# Patient Record
Sex: MALE | Race: WHITE | NOT HISPANIC OR LATINO | Employment: OTHER | URBAN - METROPOLITAN AREA
[De-identification: names, ages, dates, MRNs, and addresses within clinical notes are randomized per-mention and may not be internally consistent; named-entity substitution may affect disease eponyms.]

---

## 2017-08-31 ENCOUNTER — OFFICE VISIT (OUTPATIENT)
Dept: CARDIOLOGY | Facility: MEDICAL CENTER | Age: 77
End: 2017-08-31
Payer: MEDICARE

## 2017-08-31 ENCOUNTER — NON-PROVIDER VISIT (OUTPATIENT)
Dept: CARDIOLOGY | Facility: MEDICAL CENTER | Age: 77
End: 2017-08-31
Payer: MEDICARE

## 2017-08-31 VITALS
HEIGHT: 68 IN | SYSTOLIC BLOOD PRESSURE: 128 MMHG | DIASTOLIC BLOOD PRESSURE: 64 MMHG | HEART RATE: 78 BPM | WEIGHT: 238 LBS | OXYGEN SATURATION: 91 % | BODY MASS INDEX: 36.07 KG/M2

## 2017-08-31 DIAGNOSIS — I48.19 PERSISTENT ATRIAL FIBRILLATION (HCC): ICD-10-CM

## 2017-08-31 DIAGNOSIS — I10 ESSENTIAL HYPERTENSION: ICD-10-CM

## 2017-08-31 LAB — EKG IMPRESSION: NORMAL

## 2017-08-31 PROCEDURE — 93279 PRGRMG DEV EVAL PM/LDLS PM: CPT | Performed by: INTERNAL MEDICINE

## 2017-08-31 PROCEDURE — 93000 ELECTROCARDIOGRAM COMPLETE: CPT | Mod: 59 | Performed by: INTERNAL MEDICINE

## 2017-08-31 PROCEDURE — 99213 OFFICE O/P EST LOW 20 MIN: CPT | Performed by: INTERNAL MEDICINE

## 2017-08-31 RX ORDER — INSULIN DEGLUDEC 200 U/ML
8-10 INJECTION, SOLUTION SUBCUTANEOUS SEE ADMIN INSTRUCTIONS
COMMUNITY
Start: 2017-06-21

## 2017-08-31 ASSESSMENT — ENCOUNTER SYMPTOMS
DEPRESSION: 1
SHORTNESS OF BREATH: 1

## 2017-08-31 NOTE — PROGRESS NOTES
Subjective:   Kameron Elias is a 77 y.o. male who presents today for follow up of ppm and a fib.      His equilibrium is off.  Still plays golf 3 times a week.  Every time he moves he feels unsteady.    Some pleasures he cannot get back and attributes that to his age.    Past Medical History:   Diagnosis Date   • Neuropathic pain 12/31/2013   • Ataxia 12/31/2013   • Pain 6/29/12    b/l knees   • Benign hypertensive heart disease without heart failure 5/25/2012   • Fatigue 5/25/2012   • HTN (hypertension), malignant 5/25/2012   • Obesity 5/25/2012   • Acid reflux    • Arthritis    • Diabetes    • Hyperlipidemia    • Hypertension      Past Surgical History:   Procedure Laterality Date   • KNEE ARTHROPLASTY TOTAL  7/12/2012    Performed by ISABELLA MARK at [facility]   • CATARACT EXTRACTION WITH IOL       Family History   Problem Relation Age of Onset   • Hypertension Mother    • Lung Disease Father      COPD   • Diabetes     • Heart Disease       History   Smoking Status   • Former Smoker   • Packs/day: 1.00   • Years: 15.00   • Types: Cigarettes   • Quit date: 1/27/1971   Smokeless Tobacco   • Never Used     Comment: quit 40yrs ago     No Known Allergies  Outpatient Encounter Prescriptions as of 8/31/2017   Medication Sig Dispense Refill   • TRESIBA FLEXTOUCH 200 UNIT/ML Solution Pen-injector      • diltiazem CD (CARDIZEM CD) 360 MG CAPSULE SR 24 HR TAKE ONE CAPSULE BY MOUTH DAILY (GENERIC FOR CARDIZEM CD) 30 Cap 11   • insulin lispro (HUMALOG) 100 UNIT/ML SOLN Inject  as instructed 3 times a day before meals.     • aspirin EC (ECOTRIN) 325 MG TBEC Take 1 Tab by mouth every day. 90 Tab 6   • calcitRIOL (ROCALTROL) 0.25 MCG CAPS Take 0.25 mcg by mouth every day.     • sitagliptin (JANUVIA) 50 MG TABS Take 50 mg by mouth every day.     • ezetimibe (ZETIA) 10 MG TABS Take 10 mg by mouth every day.     • folic acid (FOLVITE) 1 MG TABS Take 1 mg by mouth every day.     • Cholecalciferol (VITAMIN D3) 1000 UNIT  "TABS Take 1,000 Units by mouth every day.     • atorvastatin (LIPITOR) 40 MG TABS Take 40 mg by mouth every day.     • furosemide (LASIX) 20 MG TABS Take 40 mg by mouth every day.     • omeprazole (PRILOSEC) 20 MG CPDR Take 20 mg by mouth every day.     • tramadol (ULTRAM) 50 MG TABS      • Insulin Glargine (LANTUS SC) Inject  as instructed.       No facility-administered encounter medications on file as of 8/31/2017.      Review of Systems   Respiratory: Positive for shortness of breath.    Neurological:        Unsteadiness   Psychiatric/Behavioral: Positive for depression.        Objective:   /64   Pulse 78   Ht 1.727 m (5' 7.99\")   Wt 108 kg (238 lb)   SpO2 91%   BMI 36.20 kg/m²     Physical Exam   Constitutional: He is oriented to person, place, and time. He appears well-developed and well-nourished. No distress.   HENT:   Head: Normocephalic and atraumatic.   Right Ear: External ear normal.   Left Ear: External ear normal.   Nose: Nose normal.   Mouth/Throat: Oropharynx is clear and moist.   Eyes: Conjunctivae and EOM are normal. Pupils are equal, round, and reactive to light. Right eye exhibits no discharge. Left eye exhibits no discharge. No scleral icterus.   Neck: Normal range of motion. Neck supple. No JVD present. No tracheal deviation present.   Cardiovascular: Normal rate, normal heart sounds and intact distal pulses.  An irregularly irregular rhythm present. Exam reveals no gallop and no friction rub.    No murmur heard.  Pulmonary/Chest: Effort normal and breath sounds normal. No stridor. No respiratory distress. He has no wheezes. He has no rales. He exhibits no tenderness.   Abdominal: Soft. He exhibits no distension. There is no tenderness.   Musculoskeletal: He exhibits no edema or tenderness.   Neurological: He is alert and oriented to person, place, and time. No cranial nerve deficit. Coordination normal.   Skin: Skin is warm and dry. No rash noted. He is not diaphoretic. No erythema. " No pallor.   Psychiatric: He has a normal mood and affect. His behavior is normal. Judgment and thought content normal.   Vitals reviewed.      Assessment:     1. Paroxysmal atrial fibrillation (CMS-HCC)  EKG   2. Essential hypertension  EKG       Medical Decision Making:  Today's Assessment / Status / Plan:   Persistent a fib - refuses oac.  Rate controlled.  No change in regimen.  Ppm functions well  htn well controlled

## 2018-01-24 ENCOUNTER — HOSPITAL ENCOUNTER (EMERGENCY)
Dept: HOSPITAL 8 - ED | Age: 78
Discharge: HOME | End: 2018-01-24
Payer: MEDICARE

## 2018-01-24 VITALS — SYSTOLIC BLOOD PRESSURE: 145 MMHG | DIASTOLIC BLOOD PRESSURE: 73 MMHG

## 2018-01-24 VITALS — HEIGHT: 68 IN | WEIGHT: 242.51 LBS | BODY MASS INDEX: 36.75 KG/M2

## 2018-01-24 DIAGNOSIS — Z95.0: ICD-10-CM

## 2018-01-24 DIAGNOSIS — J18.9: Primary | ICD-10-CM

## 2018-01-24 LAB
ALBUMIN SERPL-MCNC: 2.8 G/DL (ref 3.4–5)
ALP SERPL-CCNC: 92 U/L (ref 45–117)
ALT SERPL-CCNC: 20 U/L (ref 12–78)
ANION GAP SERPL CALC-SCNC: 8 MMOL/L (ref 5–15)
APTT BLD: 29 SECONDS (ref 25–31)
BASOPHILS # BLD AUTO: 0.02 X10^3/UL (ref 0–0.1)
BASOPHILS NFR BLD AUTO: 0 % (ref 0–1)
BILIRUB SERPL-MCNC: 0.6 MG/DL (ref 0.2–1)
CALCIUM SERPL-MCNC: 8.3 MG/DL (ref 8.5–10.1)
CHLORIDE SERPL-SCNC: 105 MMOL/L (ref 98–107)
CREAT SERPL-MCNC: 2.79 MG/DL (ref 0.7–1.3)
D DIMER PPP FEU-MCNC: 1.35 UG/MLFEU (ref 0–0.52)
EOSINOPHIL # BLD AUTO: 0.11 X10^3/UL (ref 0–0.4)
EOSINOPHIL NFR BLD AUTO: 1 % (ref 1–7)
ERYTHROCYTE [DISTWIDTH] IN BLOOD BY AUTOMATED COUNT: 15.8 % (ref 9.4–14.8)
INR PPP: 0.98 (ref 0.93–1.1)
LYMPHOCYTES # BLD AUTO: 1.87 X10^3/UL (ref 1–3.4)
LYMPHOCYTES NFR BLD AUTO: 16 % (ref 22–44)
MCH RBC QN AUTO: 28.8 PG (ref 27.5–34.5)
MCHC RBC AUTO-ENTMCNC: 33 G/DL (ref 33.2–36.2)
MCV RBC AUTO: 87.1 FL (ref 81–97)
MD: NO
MONOCYTES # BLD AUTO: 0.42 X10^3/UL (ref 0.2–0.8)
MONOCYTES NFR BLD AUTO: 4 % (ref 2–9)
NEUTROPHILS # BLD AUTO: 9.36 X10^3/UL (ref 1.8–6.8)
NEUTROPHILS NFR BLD AUTO: 80 % (ref 42–75)
PLATELET # BLD AUTO: 158 X10^3/UL (ref 130–400)
PMV BLD AUTO: 8.4 FL (ref 7.4–10.4)
PROT SERPL-MCNC: 6.6 G/DL (ref 6.4–8.2)
PROTHROMBIN TIME: 10.2 SECONDS (ref 9.6–11.5)
RBC # BLD AUTO: 3.55 X10^6/UL (ref 4.38–5.82)
TROPONIN I SERPL-MCNC: < 0.015 NG/ML (ref 0–0.04)

## 2018-01-24 PROCEDURE — A9540 TC99M MAA: HCPCS

## 2018-01-24 PROCEDURE — 78582 LUNG VENTILAT&PERFUS IMAGING: CPT

## 2018-01-24 PROCEDURE — 85025 COMPLETE CBC W/AUTO DIFF WBC: CPT

## 2018-01-24 PROCEDURE — 85379 FIBRIN DEGRADATION QUANT: CPT

## 2018-01-24 PROCEDURE — C9898 INPNT STAY RADIOLABELED ITEM: HCPCS

## 2018-01-24 PROCEDURE — 36415 COLL VENOUS BLD VENIPUNCTURE: CPT

## 2018-01-24 PROCEDURE — A9558 XE133 XENON 10MCI: HCPCS

## 2018-01-24 PROCEDURE — 93005 ELECTROCARDIOGRAM TRACING: CPT

## 2018-01-24 PROCEDURE — 71045 X-RAY EXAM CHEST 1 VIEW: CPT

## 2018-01-24 PROCEDURE — 84484 ASSAY OF TROPONIN QUANT: CPT

## 2018-01-24 PROCEDURE — 85610 PROTHROMBIN TIME: CPT

## 2018-01-24 PROCEDURE — 99285 EMERGENCY DEPT VISIT HI MDM: CPT

## 2018-01-24 PROCEDURE — 80053 COMPREHEN METABOLIC PANEL: CPT

## 2018-01-24 PROCEDURE — 85730 THROMBOPLASTIN TIME PARTIAL: CPT

## 2018-04-06 ENCOUNTER — NON-PROVIDER VISIT (OUTPATIENT)
Dept: CARDIOLOGY | Facility: MEDICAL CENTER | Age: 78
End: 2018-04-06
Payer: MEDICARE

## 2018-04-06 DIAGNOSIS — R00.1 BRADYCARDIA, SINUS: ICD-10-CM

## 2018-04-06 DIAGNOSIS — Z95.0 CARDIAC PACEMAKER IN SITU: ICD-10-CM

## 2018-04-06 PROCEDURE — 93279 PRGRMG DEV EVAL PM/LDLS PM: CPT | Performed by: INTERNAL MEDICINE

## 2019-01-11 ENCOUNTER — OFFICE VISIT (OUTPATIENT)
Dept: CARDIOLOGY | Facility: MEDICAL CENTER | Age: 79
End: 2019-01-11
Payer: MEDICARE

## 2019-01-11 ENCOUNTER — NON-PROVIDER VISIT (OUTPATIENT)
Dept: CARDIOLOGY | Facility: MEDICAL CENTER | Age: 79
End: 2019-01-11
Payer: MEDICARE

## 2019-01-11 VITALS
DIASTOLIC BLOOD PRESSURE: 62 MMHG | WEIGHT: 242.51 LBS | HEIGHT: 68 IN | SYSTOLIC BLOOD PRESSURE: 100 MMHG | OXYGEN SATURATION: 90 % | BODY MASS INDEX: 36.75 KG/M2 | HEART RATE: 62 BPM

## 2019-01-11 DIAGNOSIS — I50.32 CHRONIC DIASTOLIC CONGESTIVE HEART FAILURE (HCC): Chronic | ICD-10-CM

## 2019-01-11 DIAGNOSIS — I48.0 PAF (PAROXYSMAL ATRIAL FIBRILLATION) (HCC): Primary | ICD-10-CM

## 2019-01-11 DIAGNOSIS — N18.4 CKD (CHRONIC KIDNEY DISEASE), STAGE IV (HCC): ICD-10-CM

## 2019-01-11 DIAGNOSIS — I10 ESSENTIAL HYPERTENSION: ICD-10-CM

## 2019-01-11 DIAGNOSIS — R00.1 BRADYCARDIA, SINUS: ICD-10-CM

## 2019-01-11 DIAGNOSIS — I95.9 HYPOTENSION, UNSPECIFIED HYPOTENSION TYPE: ICD-10-CM

## 2019-01-11 DIAGNOSIS — Z95.0 CARDIAC PACEMAKER IN SITU: ICD-10-CM

## 2019-01-11 PROCEDURE — 93279 PRGRMG DEV EVAL PM/LDLS PM: CPT | Performed by: INTERNAL MEDICINE

## 2019-01-11 PROCEDURE — 99214 OFFICE O/P EST MOD 30 MIN: CPT | Performed by: INTERNAL MEDICINE

## 2019-01-11 RX ORDER — LISINOPRIL 10 MG/1
TABLET ORAL
COMMUNITY
Start: 2018-10-18 | End: 2019-01-11

## 2019-01-11 ASSESSMENT — ENCOUNTER SYMPTOMS: SHORTNESS OF BREATH: 1

## 2019-01-11 NOTE — LETTER
Name:          Kameron Elias   YOB: 1940  Date:     01/11/2019      Dorian De M.D.  [address] [facility] [place] [address]     Sonny Rosas MD  [address] [facility], [facility] [address]  [place] [address]  Phone: [phone]  Back Line: [phone]  Fax: [phone]  E-mail: [email or web address]   Dear Dr. De,    We had the pleasure of seeing your patient, Kameron Elias, in Cardiology Clinic at [facility] today.    As you know, he is a 78-year-old man with a history of sick sinus syndrome status post pacemaker implantation (implanted 9/2014), and stage IV chronic kidney disease. He has declined oral anticoagulants after serial recommendation.    He has no cardiovascular complaints today, but came in with a blood pressure of 84/60 mmHg.  We called his pharmacy as there is some question about his medications.  What we rectified from them was that he is on diltiazem 360 mg p.o. daily, atorvastatin, and aspirin.  There is previously some record of him being on lisinopril, and it appears that he has not been filling that medication in conjunction with his heart failure with preserved ejection fraction.  I certainly would not start him on that given his hypotension in our clinic today.  He did tell me that he would send in blood pressures in a month or so to confirm that he does not have hypotension at home otherwise.    I reviewed again with him his substantial risk for stroke risk factors including age of 78, hypertension, diabetes, and heart failure with preserved ejection fraction.  I again recommended an oral anticoagulant, and I would choose Eliquis at 2.5 mg p.o. twice daily given his age and renal function.  He is not comfortable at this point starting that though he does believe that he will started at the age of 81.  For now, he continues aspirin.    Return in about 6 months (around 7/11/2019).    Thank you for the referral and please do not hesitate to contact me at any time. My  contact information is listed above.    This note was dictated using Dragon speech recognition software.     A full note including my physical examination and a full list of rectified medications is available in our medical record, and can be faxed as well.    Sonny Rosas MD  Cardiologist  [facility]

## 2019-01-11 NOTE — PROGRESS NOTES
Chief Complaint   Patient presents with   • Atrial Fibrillation       Subjective:   Kameron Elias is a 78 -year-old man with a history of sick sinus syndrome status post pacemaker implantation (implanted 9/2014), and stage IV chronic kidney disease. He has declined oral anticoagulants after serial recommendation.    He is doing well today, and has no cardiovascular complaints no side effects on his blood pressure regimen.  He specifically denies orthostasis, and tells me that his home blood pressures are in the 120s over 80s mmHg reliably despite his blood pressure today in our clinic, which is much less than that.  He denies any orthopnea or significant lower extremity edema on Lasix.    He had quite a few questions about his original syncopal event when he had junctional rhythm and sepsis in 2013 when he was admitted at [facility].    Past Medical History:   Diagnosis Date   • Acid reflux    • Arthritis    • Ataxia 12/31/2013   • Benign hypertensive heart disease without heart failure 5/25/2012   • Diabetes    • Fatigue 5/25/2012   • HTN (hypertension), malignant 5/25/2012   • Hyperlipidemia    • Hypertension    • Neuropathic pain 12/31/2013   • Obesity 5/25/2012   • Pain 6/29/12    b/l knees     Past Surgical History:   Procedure Laterality Date   • KNEE ARTHROPLASTY TOTAL  7/12/2012    Performed by ISABELLA MARK at SURGERY [facility]   • CATARACT EXTRACTION WITH IOL       Family History   Problem Relation Age of Onset   • Hypertension Mother    • Lung Disease Father         COPD   • Diabetes Unknown    • Heart Disease Unknown      Social History     Social History   • Marital status:      Spouse name: N/A   • Number of children: N/A   • Years of education: N/A     Occupational History   • Not on file.     Social History Main Topics   • Smoking status: Former Smoker     Packs/day: 1.00     Years: 15.00     Types: Cigarettes     Quit date: 1/27/1971   • Smokeless tobacco: Never Used       Comment: quit 40yrs ago   • Alcohol use 10.5 oz/week     21 Standard drinks or equivalent per week   • Drug use: No   • Sexual activity: Not on file     Other Topics Concern   • Not on file     Social History Narrative   • No narrative on file     No Known Allergies  Outpatient Encounter Prescriptions as of 1/11/2019   Medication Sig Dispense Refill   • insulin aspart (NOVOLOG) 100 UNIT/ML Solution Inject  as instructed 3 times a day before meals.     • TRESIBA FLEXTOUCH 200 UNIT/ML Solution Pen-injector      • diltiazem CD (CARDIZEM CD) 360 MG CAPSULE SR 24 HR TAKE ONE CAPSULE BY MOUTH DAILY (GENERIC FOR CARDIZEM CD) 30 Cap 11   • aspirin EC (ECOTRIN) 325 MG TBEC Take 1 Tab by mouth every day. 90 Tab 6   • calcitRIOL (ROCALTROL) 0.25 MCG CAPS Take 0.25 mcg by mouth every day.     • sitagliptin (JANUVIA) 50 MG TABS Take 50 mg by mouth every day.     • ezetimibe (ZETIA) 10 MG TABS Take 10 mg by mouth every day.     • folic acid (FOLVITE) 1 MG TABS Take 1 mg by mouth every day.     • Cholecalciferol (VITAMIN D3) 1000 UNIT TABS Take 1,000 Units by mouth every day.     • atorvastatin (LIPITOR) 40 MG TABS Take 40 mg by mouth every day.     • furosemide (LASIX) 20 MG TABS Take 40 mg by mouth every day.     • omeprazole (PRILOSEC) 20 MG CPDR Take 20 mg by mouth every day.     • [DISCONTINUED] lisinopril (PRINIVIL) 10 MG Tab      • [DISCONTINUED] tramadol (ULTRAM) 50 MG TABS      • [DISCONTINUED] insulin lispro (HUMALOG) 100 UNIT/ML SOLN Inject  as instructed 3 times a day before meals.     • [DISCONTINUED] Insulin Glargine (LANTUS SC) Inject  as instructed.       No facility-administered encounter medications on file as of 1/11/2019.      Review of Systems   Constitutional: Positive for malaise/fatigue.   Respiratory: Positive for shortness of breath.    All other systems reviewed and are negative.       Objective:   /62 (BP Location: Right arm, Patient Position: Sitting, BP Cuff Size: Adult)   Pulse 62   Ht 1.727  "m (5' 7.99\")   Wt 110 kg (242 lb 8.1 oz)   SpO2 90%   BMI 36.88 kg/m²     Physical Exam   Constitutional: He is oriented to person, place, and time. He appears well-developed and well-nourished. No distress.   Pleasant, centrally obese, elderly man in no distress   Eyes: Pupils are equal, round, and reactive to light. EOM are normal.   Neck: No JVD present.   Cardiovascular: Normal rate and regular rhythm.  Exam reveals no gallop and no friction rub.    No murmur heard.  No carotid bruits.  Pacemaker generator noted in left upper chest.   Pulmonary/Chest: Effort normal and breath sounds normal. No respiratory distress. He has no wheezes. He has no rales.   Abdominal: Soft. Bowel sounds are normal. He exhibits no distension.   Musculoskeletal: He exhibits no edema (No significant lower extremity edema bilaterally).   Neurological: He is alert and oriented to person, place, and time.   Skin: Skin is warm and dry. No rash noted. He is not diaphoretic. No erythema. No pallor.   Psychiatric: He has a normal mood and affect. Judgment and thought content normal.   Nursing note and vitals reviewed.    Labs, 9/14/2018  Chemistry panel: Sodium 142, potassium 4.8, chloride 103, CO2 24, BUN 55, creatinine 2.5, glucose 143, calcium 9.0  Hemoglobin A1c 8.3  CBC, 6/25/2018: WBC 11.1, hemoglobin 12.3, platelets 197  Lipids: LDL 60, HDL 35, triglycerides 84, total cholesterol 112    Echocardiogram, 7/31/2014:  \"CONCLUSIONS  Normal left ventricular chamber size. Moderate  left ventricular   hypertrophy. Normal left ventricular systolic function. Left   ventricular ejection fraction is 60% to 65%.  The right ventricle was normal in size and function.    Moderately increased LA volume. Mildly increased LA area.  Mitral annular calcification. Mild mitral regurgitation.  Structurally normal aortic valve without significant stenosis or   regurgitation.    Tricuspid valve opens normally. Mild tricuspid regurgitation. Right   ventricular " "systolic pressure is estimated to be 21 mmHg\"    Assessment:     1. PAF (paroxysmal atrial fibrillation) (HCC)     2. Cardiac pacemaker in situ     3. Hypotension, unspecified hypotension type  EC-ECHOCARDIOGRAM COMPLETE W/O CONT   4. Chronic diastolic congestive heart failure (HCC)     5. CKD (chronic kidney disease), stage IV (HCC)     6. Essential hypertension         Medical Decision Making:  Today's Assessment / Status / Plan:     He has no cardiovascular complaints today, but came in with a blood pressure of 84/60 mmHg.  We called his pharmacy as there is some question about his medications.  What we rectified from them was that he is on diltiazem 360 mg p.o. daily, atorvastatin, and aspirin.  There is previously some record of him being on lisinopril, and it appears that he has not been filling that medication in conjunction with his heart failure with preserved ejection fraction.  I certainly would not start him on that given his hypotension in our clinic today.  He did tell me that he would send in blood pressures in a month or so to confirm that he does not have hypotension at home otherwise.    I reviewed again with him his substantial risk for stroke risk factors including age of 78, hypertension, diabetes, and heart failure with preserved ejection fraction.  I again recommended an oral anticoagulant, and I would choose Eliquis at 2.5 mg p.o. twice daily given his age and renal function.  He is not comfortable at this point starting that though he does believe that he will started at the age of 81.  For now, he continues aspirin.    Sonny Rosas MD  Cardiologist, [facility] Surveyor     No Follow-up on file.    "

## 2019-03-05 ENCOUNTER — HOSPITAL ENCOUNTER (OUTPATIENT)
Dept: HOSPITAL 8 - CFH | Age: 79
Discharge: HOME | End: 2019-03-05
Attending: SPECIALIST
Payer: MEDICARE

## 2019-03-05 DIAGNOSIS — H53.8: ICD-10-CM

## 2019-03-05 DIAGNOSIS — G31.9: Primary | ICD-10-CM

## 2019-03-05 DIAGNOSIS — H53.2: ICD-10-CM

## 2019-03-05 PROCEDURE — 70450 CT HEAD/BRAIN W/O DYE: CPT

## 2019-07-24 ENCOUNTER — NON-PROVIDER VISIT (OUTPATIENT)
Dept: CARDIOLOGY | Facility: MEDICAL CENTER | Age: 79
End: 2019-07-24
Payer: MEDICARE

## 2019-07-24 ENCOUNTER — OFFICE VISIT (OUTPATIENT)
Dept: CARDIOLOGY | Facility: MEDICAL CENTER | Age: 79
End: 2019-07-24
Payer: MEDICARE

## 2019-07-24 ENCOUNTER — TELEPHONE (OUTPATIENT)
Dept: CARDIOLOGY | Facility: MEDICAL CENTER | Age: 79
End: 2019-07-24

## 2019-07-24 VITALS
DIASTOLIC BLOOD PRESSURE: 60 MMHG | HEART RATE: 64 BPM | BODY MASS INDEX: 35.77 KG/M2 | SYSTOLIC BLOOD PRESSURE: 118 MMHG | OXYGEN SATURATION: 91 % | WEIGHT: 236 LBS | HEIGHT: 68 IN

## 2019-07-24 DIAGNOSIS — I48.0 PAF (PAROXYSMAL ATRIAL FIBRILLATION) (HCC): ICD-10-CM

## 2019-07-24 DIAGNOSIS — I49.5 SICK SINUS SYNDROME (HCC): ICD-10-CM

## 2019-07-24 DIAGNOSIS — I10 ESSENTIAL HYPERTENSION: ICD-10-CM

## 2019-07-24 DIAGNOSIS — Z95.0 CARDIAC PACEMAKER IN SITU: ICD-10-CM

## 2019-07-24 DIAGNOSIS — E78.5 DYSLIPIDEMIA: ICD-10-CM

## 2019-07-24 DIAGNOSIS — Z79.899 ENCOUNTER FOR LONG-TERM (CURRENT) USE OF HIGH-RISK MEDICATION: ICD-10-CM

## 2019-07-24 PROCEDURE — 93279 PRGRMG DEV EVAL PM/LDLS PM: CPT | Performed by: INTERNAL MEDICINE

## 2019-07-24 PROCEDURE — 99215 OFFICE O/P EST HI 40 MIN: CPT | Mod: 25 | Performed by: INTERNAL MEDICINE

## 2019-07-24 RX ORDER — LISINOPRIL 10 MG/1
TABLET ORAL
COMMUNITY
Start: 2019-06-10 | End: 2023-01-01

## 2019-07-24 RX ORDER — DILTIAZEM HYDROCHLORIDE 240 MG/1
240 CAPSULE, EXTENDED RELEASE ORAL 2 TIMES DAILY
Qty: 180 CAP | Refills: 3 | Status: SHIPPED | OUTPATIENT
Start: 2019-07-24 | End: 2023-01-01

## 2019-07-24 ASSESSMENT — ENCOUNTER SYMPTOMS
FALLS: 0
DIZZINESS: 0
LOSS OF CONSCIOUSNESS: 0
SHORTNESS OF BREATH: 1
DEPRESSION: 0
ORTHOPNEA: 0
PALPITATIONS: 0
PND: 0
ABDOMINAL PAIN: 0

## 2019-07-24 NOTE — LETTER
[facility]   [address] [place] [address] [address]  [place] [address]  Phone: [phone]  Fax: [phone]              Kameron Elias  1940    Encounter Date: 7/24/2019    Patito Ayon M.D.          PROGRESS NOTE:  Chief Complaint   Patient presents with   • Congestive Heart Failure   • Atrial Fibrillation       Subjective:   Kameron Elias is a 79 y.o. male who presents today to follow-up on his atrial fibrillation.    Pertinent history:  Paroxysmal atrial fibrillation  Hypertension  Hyperlipidemia  Diabetes  Diastolic dysfunction  Chronic kidney disease    She has known atrial fibrillation and denies any palpitations or dizziness.  He is not on any anticoagulation.  No history of bleeding issues.  He reports that his blood pressures are usually in the 120 systolic however in the afternoons in the evenings it is consistently 150s or higher systolic.  For his hyperlipidemia he is currently on Lipitor and Zetia which he is tolerating well.  No side effects.    He has known chronic kidney disease.  About 7 years ago he was on dialysis transiently.  He is followed closely by his nephrologist who per patient report has stated that his kidney function has been improving.  Patient does not know if he is currently taking lisinopril or not.    He has a history of diastolic dysfunction.  He denies any lower extremity edema.  No heart failure symptoms.    Patient's pacemaker was interrogated today showing normal device function.  He is ventricularly paced about 35% of the time.  20 episodes of ventricular high rates were noted however in review of the EGS it appears that he was in atrial fibrillation.  Most of the ventricular EGM's show an irregular rhythm.  1 of the EGM's shows concern for possible group beating and could suggest PVCs.    Patient reports having shortness of breath that has been fairly stable for the past 4 to 5 years.  He can walk about 30 to 50 feet before needing to stop  to catch his breath.  No associated chest discomfort or palpitations.    Past Medical History:   Diagnosis Date   • Acid reflux    • Arthritis    • Ataxia 12/31/2013   • Benign hypertensive heart disease without heart failure 5/25/2012   • Diabetes    • Fatigue 5/25/2012   • HTN (hypertension), malignant 5/25/2012   • Hyperlipidemia    • Hypertension    • Neuropathic pain 12/31/2013   • Obesity 5/25/2012   • Pain 6/29/12    b/l knees     Past Surgical History:   Procedure Laterality Date   • KNEE ARTHROPLASTY TOTAL  7/12/2012    Performed by ISABELLA MARK at SURGERY [facility]   • CATARACT EXTRACTION WITH IOL       Family History   Problem Relation Age of Onset   • Hypertension Mother    • Lung Disease Father         COPD   • Diabetes Unknown    • Heart Disease Unknown      Social History     Social History   • Marital status:      Spouse name: N/A   • Number of children: N/A   • Years of education: N/A     Occupational History   • Not on file.     Social History Main Topics   • Smoking status: Former Smoker     Packs/day: 1.00     Years: 15.00     Types: Cigarettes     Quit date: 1/27/1971   • Smokeless tobacco: Never Used      Comment: quit 40yrs ago   • Alcohol use 10.5 oz/week     21 Standard drinks or equivalent per week   • Drug use: No   • Sexual activity: Not on file     Other Topics Concern   • Not on file     Social History Narrative   • No narrative on file     No Known Allergies  Outpatient Encounter Prescriptions as of 7/24/2019   Medication Sig Dispense Refill   • DILTIAZem CD (TIAZAC) 240 MG SR capsule Take 1 Cap by mouth 2 Times a Day. 180 Cap 3   • insulin aspart (NOVOLOG) 100 UNIT/ML Solution Inject  as instructed 3 times a day before meals.     • TRESIBA FLEXTOUCH 200 UNIT/ML Solution Pen-injector      • aspirin EC (ECOTRIN) 325 MG TBEC Take 1 Tab by mouth every day. 90 Tab 6   • calcitRIOL (ROCALTROL) 0.25 MCG CAPS Take 0.25 mcg by mouth every day.     • ezetimibe (ZETIA) 10 MG  "TABS Take 10 mg by mouth every day.     • folic acid (FOLVITE) 1 MG TABS Take 1 mg by mouth every day.     • Cholecalciferol (VITAMIN D3) 1000 UNIT TABS Take 1,000 Units by mouth every day.     • atorvastatin (LIPITOR) 40 MG TABS Take 40 mg by mouth every day.     • furosemide (LASIX) 20 MG TABS Take 40 mg by mouth every day.     • omeprazole (PRILOSEC) 20 MG CPDR Take 20 mg by mouth every day.     • lisinopril (PRINIVIL) 10 MG Tab      • [DISCONTINUED] diltiazem CD (CARDIZEM CD) 360 MG CAPSULE SR 24 HR TAKE ONE CAPSULE BY MOUTH DAILY (GENERIC FOR CARDIZEM CD) 30 Cap 11   • sitagliptin (JANUVIA) 50 MG TABS Take 50 mg by mouth every day.       No facility-administered encounter medications on file as of 7/24/2019.      Review of Systems   Constitutional: Negative for malaise/fatigue.   Respiratory: Positive for shortness of breath.    Cardiovascular: Negative for chest pain, palpitations, orthopnea, leg swelling and PND.   Gastrointestinal: Negative for abdominal pain.   Musculoskeletal: Negative for falls.   Neurological: Negative for dizziness and loss of consciousness.   Psychiatric/Behavioral: Negative for depression.   All other systems reviewed and are negative.       Objective:   /60 (BP Location: Left arm, Patient Position: Sitting, BP Cuff Size: Adult)   Pulse 64   Ht 1.727 m (5' 8\")   Wt 107 kg (236 lb)   SpO2 91%   BMI 35.88 kg/m²      Physical Exam   Constitutional: He is oriented to person, place, and time. He appears well-developed and well-nourished. No distress.   HENT:   Head: Normocephalic and atraumatic.   Eyes: Conjunctivae are normal. No scleral icterus.   Neck: Normal range of motion. Neck supple.   Cardiovascular: Normal rate, regular rhythm and normal heart sounds.  Exam reveals no gallop and no friction rub.    No murmur heard.  Pulmonary/Chest: Effort normal and breath sounds normal. No respiratory distress. He has no wheezes. He has no rales.   Abdominal: Soft. He exhibits no " distension. There is no tenderness.   Musculoskeletal: He exhibits no edema.   Neurological: He is alert and oriented to person, place, and time.   Skin: Skin is warm and dry. He is not diaphoretic.   Psychiatric: He has a normal mood and affect. His behavior is normal.   Nursing note and vitals reviewed.    Labs performed in July 2019 were reviewed and showed creatinine 3.24.  Potassium 5.6.  Normal T4.  LDL 57.  HDL 37.  Hemoglobin 12.4.    Assessment:     1. PAF (paroxysmal atrial fibrillation) ([facility])  Basic Metabolic Panel    DILTIAZem CD (TIAZAC) 240 MG SR capsule   2. Cardiac pacemaker in situ     3. Dyslipidemia  Lipid Profile   4. Essential hypertension     5. Encounter for long-term (current) use of high-risk medication         Medical Decision Making:  Today's Assessment / Status / Plan:     Paroxysmal atrial fibrillation:  Status post permanent pacemaker:  Hypertension:    Patient denies any palpitations.  His pacemaker interrogation today shows concern for possible high ventricular rates versus PVCs.  His blood pressure is at goal today however consistently his blood pressure has been elevated in the evenings.  Increase diltiazem from 360 mg once a day to 240 mg twice daily.  If the patient continues to have elevated blood pressures, he should let us know.    Patient has elevated CHADS-Vasc score.  I spent majority of the time today discussing the risks and benefits of anticoagulation.  Patient states that he has had multiple doctors talk to him about the above.  He understands his risk for stroke but does not want to take the risk of bleeding that is associated with anticoagulation and more specifically other side effects that could occur with the medication.  He states that he is 79 years old and ready to go when his time comes.  I explained to him the disability that can often be associated with strokes but the patient is not interested in this topic.  He will continue to take his aspirin  regularly.    Hyperlipidemia: LDL is at goal.  Continue Lipitor and Zetia at current dose.    Return to clinic in 6 months or earlier if needed.  Repeat pacemaker interrogation in 6 months.    Thank you for allowing me to participate in the care of this patient. Please do not hesitate to contact me with any questions.    Patito Ayon MD  Cardiologist  [facility]      PLEASE NOTE: This dictation was created using voice recognition software.         Dorian De M.D.  [address] [place] [id number]  VIA Facsimile: [phone]

## 2019-07-24 NOTE — TELEPHONE ENCOUNTER
Called [facility] and confirmed that the Rx that should be filled is Diltiazem 240mg bid as prescribed by Dr. Ayon and not the Rx sent in by the pt's PCP. Representative states understanding and made a note in the pt's file.

## 2019-07-24 NOTE — PROGRESS NOTES
Chief Complaint   Patient presents with   • Congestive Heart Failure   • Atrial Fibrillation       Subjective:   Kameron Elias is a 79 y.o. male who presents today to follow-up on his atrial fibrillation.    Pertinent history:  Paroxysmal atrial fibrillation  Hypertension  Hyperlipidemia  Diabetes  Diastolic dysfunction  Chronic kidney disease    She has known atrial fibrillation and denies any palpitations or dizziness.  He is not on any anticoagulation.  No history of bleeding issues.  He reports that his blood pressures are usually in the 120 systolic however in the afternoons in the evenings it is consistently 150s or higher systolic.  For his hyperlipidemia he is currently on Lipitor and Zetia which he is tolerating well.  No side effects.    He has known chronic kidney disease.  About 7 years ago he was on dialysis transiently.  He is followed closely by his nephrologist who per patient report has stated that his kidney function has been improving.  Patient does not know if he is currently taking lisinopril or not.    He has a history of diastolic dysfunction.  He denies any lower extremity edema.  No heart failure symptoms.    Patient's pacemaker was interrogated today showing normal device function.  He is ventricularly paced about 35% of the time.  20 episodes of ventricular high rates were noted however in review of the EGS it appears that he was in atrial fibrillation.  Most of the ventricular EGM's show an irregular rhythm.  1 of the EGM's shows concern for possible group beating and could suggest PVCs.    Patient reports having shortness of breath that has been fairly stable for the past 4 to 5 years.  He can walk about 30 to 50 feet before needing to stop to catch his breath.  No associated chest discomfort or palpitations.    Past Medical History:   Diagnosis Date   • Acid reflux    • Arthritis    • Ataxia 12/31/2013   • Benign hypertensive heart disease without heart failure 5/25/2012   • Diabetes    •  Fatigue 5/25/2012   • HTN (hypertension), malignant 5/25/2012   • Hyperlipidemia    • Hypertension    • Neuropathic pain 12/31/2013   • Obesity 5/25/2012   • Pain 6/29/12    b/l knees     Past Surgical History:   Procedure Laterality Date   • KNEE ARTHROPLASTY TOTAL  7/12/2012    Performed by ISABELLA MARK at SURGERY [facility]   • CATARACT EXTRACTION WITH IOL       Family History   Problem Relation Age of Onset   • Hypertension Mother    • Lung Disease Father         COPD   • Diabetes Unknown    • Heart Disease Unknown      Social History     Social History   • Marital status:      Spouse name: N/A   • Number of children: N/A   • Years of education: N/A     Occupational History   • Not on file.     Social History Main Topics   • Smoking status: Former Smoker     Packs/day: 1.00     Years: 15.00     Types: Cigarettes     Quit date: 1/27/1971   • Smokeless tobacco: Never Used      Comment: quit 40yrs ago   • Alcohol use 10.5 oz/week     21 Standard drinks or equivalent per week   • Drug use: No   • Sexual activity: Not on file     Other Topics Concern   • Not on file     Social History Narrative   • No narrative on file     No Known Allergies  Outpatient Encounter Prescriptions as of 7/24/2019   Medication Sig Dispense Refill   • DILTIAZem CD (TIAZAC) 240 MG SR capsule Take 1 Cap by mouth 2 Times a Day. 180 Cap 3   • insulin aspart (NOVOLOG) 100 UNIT/ML Solution Inject  as instructed 3 times a day before meals.     • TRESIBA FLEXTOUCH 200 UNIT/ML Solution Pen-injector      • aspirin EC (ECOTRIN) 325 MG TBEC Take 1 Tab by mouth every day. 90 Tab 6   • calcitRIOL (ROCALTROL) 0.25 MCG CAPS Take 0.25 mcg by mouth every day.     • ezetimibe (ZETIA) 10 MG TABS Take 10 mg by mouth every day.     • folic acid (FOLVITE) 1 MG TABS Take 1 mg by mouth every day.     • Cholecalciferol (VITAMIN D3) 1000 UNIT TABS Take 1,000 Units by mouth every day.     • atorvastatin (LIPITOR) 40 MG TABS Take 40 mg by mouth every  "day.     • furosemide (LASIX) 20 MG TABS Take 40 mg by mouth every day.     • omeprazole (PRILOSEC) 20 MG CPDR Take 20 mg by mouth every day.     • lisinopril (PRINIVIL) 10 MG Tab      • [DISCONTINUED] diltiazem CD (CARDIZEM CD) 360 MG CAPSULE SR 24 HR TAKE ONE CAPSULE BY MOUTH DAILY (GENERIC FOR CARDIZEM CD) 30 Cap 11   • sitagliptin (JANUVIA) 50 MG TABS Take 50 mg by mouth every day.       No facility-administered encounter medications on file as of 7/24/2019.      Review of Systems   Constitutional: Negative for malaise/fatigue.   Respiratory: Positive for shortness of breath.    Cardiovascular: Negative for chest pain, palpitations, orthopnea, leg swelling and PND.   Gastrointestinal: Negative for abdominal pain.   Musculoskeletal: Negative for falls.   Neurological: Negative for dizziness and loss of consciousness.   Psychiatric/Behavioral: Negative for depression.   All other systems reviewed and are negative.       Objective:   /60 (BP Location: Left arm, Patient Position: Sitting, BP Cuff Size: Adult)   Pulse 64   Ht 1.727 m (5' 8\")   Wt 107 kg (236 lb)   SpO2 91%   BMI 35.88 kg/m²     Physical Exam   Constitutional: He is oriented to person, place, and time. He appears well-developed and well-nourished. No distress.   HENT:   Head: Normocephalic and atraumatic.   Eyes: Conjunctivae are normal. No scleral icterus.   Neck: Normal range of motion. Neck supple.   Cardiovascular: Normal rate, regular rhythm and normal heart sounds.  Exam reveals no gallop and no friction rub.    No murmur heard.  Pulmonary/Chest: Effort normal and breath sounds normal. No respiratory distress. He has no wheezes. He has no rales.   Abdominal: Soft. He exhibits no distension. There is no tenderness.   Musculoskeletal: He exhibits no edema.   Neurological: He is alert and oriented to person, place, and time.   Skin: Skin is warm and dry. He is not diaphoretic.   Psychiatric: He has a normal mood and affect. His behavior " is normal.   Nursing note and vitals reviewed.    Labs performed in July 2019 were reviewed and showed creatinine 3.24.  Potassium 5.6.  Normal T4.  LDL 57.  HDL 37.  Hemoglobin 12.4.    Assessment:     1. PAF (paroxysmal atrial fibrillation) ([facility])  Basic Metabolic Panel    DILTIAZem CD (TIAZAC) 240 MG SR capsule   2. Cardiac pacemaker in situ     3. Dyslipidemia  Lipid Profile   4. Essential hypertension     5. Encounter for long-term (current) use of high-risk medication         Medical Decision Making:  Today's Assessment / Status / Plan:     Paroxysmal atrial fibrillation:  Status post permanent pacemaker:  Hypertension:    Patient denies any palpitations.  His pacemaker interrogation today shows concern for possible high ventricular rates versus PVCs.  His blood pressure is at goal today however consistently his blood pressure has been elevated in the evenings.  Increase diltiazem from 360 mg once a day to 240 mg twice daily.  If the patient continues to have elevated blood pressures, he should let us know.    Patient has elevated CHADS-Vasc score.  I spent majority of the time today discussing the risks and benefits of anticoagulation.  Patient states that he has had multiple doctors talk to him about the above.  He understands his risk for stroke but does not want to take the risk of bleeding that is associated with anticoagulation and more specifically other side effects that could occur with the medication.  He states that he is 79 years old and ready to go when his time comes.  I explained to him the disability that can often be associated with strokes but the patient is not interested in this topic.  He will continue to take his aspirin regularly.    Hyperlipidemia: LDL is at goal.  Continue Lipitor and Zetia at current dose.    Return to clinic in 6 months or earlier if needed.  Repeat pacemaker interrogation in 6 months.    Thank you for allowing me to participate in the care of this patient. Please do  not hesitate to contact me with any questions.    Patito Ayon MD  Cardiologist  [facility]      PLEASE NOTE: This dictation was created using voice recognition software.

## 2019-11-03 ENCOUNTER — HOSPITAL ENCOUNTER (INPATIENT)
Dept: HOSPITAL 8 - ED | Age: 79
LOS: 3 days | Discharge: HOME HEALTH | DRG: 291 | End: 2019-11-06
Attending: INTERNAL MEDICINE | Admitting: HOSPITALIST
Payer: MEDICARE

## 2019-11-03 VITALS — DIASTOLIC BLOOD PRESSURE: 98 MMHG | SYSTOLIC BLOOD PRESSURE: 149 MMHG

## 2019-11-03 VITALS — BODY MASS INDEX: 36.12 KG/M2 | WEIGHT: 238.32 LBS | HEIGHT: 68 IN

## 2019-11-03 DIAGNOSIS — J44.9: ICD-10-CM

## 2019-11-03 DIAGNOSIS — E66.9: ICD-10-CM

## 2019-11-03 DIAGNOSIS — Z95.0: ICD-10-CM

## 2019-11-03 DIAGNOSIS — Z79.4: ICD-10-CM

## 2019-11-03 DIAGNOSIS — E11.22: ICD-10-CM

## 2019-11-03 DIAGNOSIS — Z82.49: ICD-10-CM

## 2019-11-03 DIAGNOSIS — K21.9: ICD-10-CM

## 2019-11-03 DIAGNOSIS — I50.33: ICD-10-CM

## 2019-11-03 DIAGNOSIS — I13.2: Primary | ICD-10-CM

## 2019-11-03 DIAGNOSIS — Z82.5: ICD-10-CM

## 2019-11-03 DIAGNOSIS — D50.9: ICD-10-CM

## 2019-11-03 DIAGNOSIS — Z87.891: ICD-10-CM

## 2019-11-03 DIAGNOSIS — Z96.653: ICD-10-CM

## 2019-11-03 DIAGNOSIS — N18.5: ICD-10-CM

## 2019-11-03 DIAGNOSIS — Z66: ICD-10-CM

## 2019-11-03 DIAGNOSIS — E78.5: ICD-10-CM

## 2019-11-03 DIAGNOSIS — Z99.81: ICD-10-CM

## 2019-11-03 DIAGNOSIS — G47.33: ICD-10-CM

## 2019-11-03 DIAGNOSIS — I48.0: ICD-10-CM

## 2019-11-03 DIAGNOSIS — J96.10: ICD-10-CM

## 2019-11-03 LAB
ALBUMIN SERPL-MCNC: 3.6 G/DL (ref 3.4–5)
ALP SERPL-CCNC: 86 U/L (ref 45–117)
ALT SERPL-CCNC: 15 U/L (ref 12–78)
ANION GAP SERPL CALC-SCNC: 7 MMOL/L (ref 5–15)
BASOPHILS # BLD AUTO: 0 X10^3/UL (ref 0–0.1)
BASOPHILS NFR BLD AUTO: 0 % (ref 0–1)
BILIRUB SERPL-MCNC: 0.6 MG/DL (ref 0.2–1)
CALCIUM SERPL-MCNC: 8.4 MG/DL (ref 8.5–10.1)
CHLORIDE SERPL-SCNC: 108 MMOL/L (ref 98–107)
CREAT SERPL-MCNC: 3.1 MG/DL (ref 0.7–1.3)
EOSINOPHIL # BLD AUTO: 0.09 X10^3/UL (ref 0–0.4)
EOSINOPHIL NFR BLD AUTO: 1 % (ref 1–7)
ERYTHROCYTE [DISTWIDTH] IN BLOOD BY AUTOMATED COUNT: 15.1 % (ref 9.4–14.8)
LYMPHOCYTES # BLD AUTO: 2.22 X10^3/UL (ref 1–3.4)
LYMPHOCYTES NFR BLD AUTO: 21 % (ref 22–44)
MCH RBC QN AUTO: 29.5 PG (ref 27.5–34.5)
MCHC RBC AUTO-ENTMCNC: 32.5 G/DL (ref 33.2–36.2)
MCV RBC AUTO: 90.6 FL (ref 81–97)
MD: NO
MONOCYTES # BLD AUTO: 0.55 X10^3/UL (ref 0.2–0.8)
MONOCYTES NFR BLD AUTO: 5 % (ref 2–9)
NEUTROPHILS # BLD AUTO: 7.9 X10^3/UL (ref 1.8–6.8)
NEUTROPHILS NFR BLD AUTO: 73 % (ref 42–75)
PLATELET # BLD AUTO: 169 X10^3/UL (ref 130–400)
PMV BLD AUTO: 8.4 FL (ref 7.4–10.4)
PROT SERPL-MCNC: 7.2 G/DL (ref 6.4–8.2)
RBC # BLD AUTO: 3.95 X10^6/UL (ref 4.38–5.82)
TROPONIN I SERPL-MCNC: 0.01 NG/ML (ref 0–0.04)

## 2019-11-03 PROCEDURE — 78582 LUNG VENTILAT&PERFUS IMAGING: CPT

## 2019-11-03 PROCEDURE — 84484 ASSAY OF TROPONIN QUANT: CPT

## 2019-11-03 PROCEDURE — 36415 COLL VENOUS BLD VENIPUNCTURE: CPT

## 2019-11-03 PROCEDURE — 80053 COMPREHEN METABOLIC PANEL: CPT

## 2019-11-03 PROCEDURE — 83540 ASSAY OF IRON: CPT

## 2019-11-03 PROCEDURE — 83880 ASSAY OF NATRIURETIC PEPTIDE: CPT

## 2019-11-03 PROCEDURE — 82962 GLUCOSE BLOOD TEST: CPT

## 2019-11-03 PROCEDURE — 90686 IIV4 VACC NO PRSV 0.5 ML IM: CPT

## 2019-11-03 PROCEDURE — 81001 URINALYSIS AUTO W/SCOPE: CPT

## 2019-11-03 PROCEDURE — 84100 ASSAY OF PHOSPHORUS: CPT

## 2019-11-03 PROCEDURE — A9540 TC99M MAA: HCPCS

## 2019-11-03 PROCEDURE — 82728 ASSAY OF FERRITIN: CPT

## 2019-11-03 PROCEDURE — 83036 HEMOGLOBIN GLYCOSYLATED A1C: CPT

## 2019-11-03 PROCEDURE — 99285 EMERGENCY DEPT VISIT HI MDM: CPT

## 2019-11-03 PROCEDURE — 71045 X-RAY EXAM CHEST 1 VIEW: CPT

## 2019-11-03 PROCEDURE — 83550 IRON BINDING TEST: CPT

## 2019-11-03 PROCEDURE — 85025 COMPLETE CBC W/AUTO DIFF WBC: CPT

## 2019-11-03 PROCEDURE — 84443 ASSAY THYROID STIM HORMONE: CPT

## 2019-11-03 PROCEDURE — 93306 TTE W/DOPPLER COMPLETE: CPT

## 2019-11-03 PROCEDURE — 83735 ASSAY OF MAGNESIUM: CPT

## 2019-11-03 PROCEDURE — A9558 XE133 XENON 10MCI: HCPCS

## 2019-11-03 PROCEDURE — 80061 LIPID PANEL: CPT

## 2019-11-03 PROCEDURE — 93005 ELECTROCARDIOGRAM TRACING: CPT

## 2019-11-03 PROCEDURE — 80048 BASIC METABOLIC PNL TOTAL CA: CPT

## 2019-11-03 RX ADMIN — ATORVASTATIN CALCIUM SCH MG: 40 TABLET, FILM COATED ORAL at 21:30

## 2019-11-03 RX ADMIN — HEPARIN SODIUM SCH UNITS: 5000 INJECTION, SOLUTION INTRAVENOUS; SUBCUTANEOUS at 22:42

## 2019-11-03 RX ADMIN — INSULIN GLARGINE SCH UNITS: 100 INJECTION, SOLUTION SUBCUTANEOUS at 22:41

## 2019-11-04 VITALS — DIASTOLIC BLOOD PRESSURE: 69 MMHG | SYSTOLIC BLOOD PRESSURE: 136 MMHG

## 2019-11-04 VITALS — SYSTOLIC BLOOD PRESSURE: 138 MMHG | DIASTOLIC BLOOD PRESSURE: 75 MMHG

## 2019-11-04 VITALS — DIASTOLIC BLOOD PRESSURE: 72 MMHG | SYSTOLIC BLOOD PRESSURE: 139 MMHG

## 2019-11-04 VITALS — SYSTOLIC BLOOD PRESSURE: 112 MMHG | DIASTOLIC BLOOD PRESSURE: 66 MMHG

## 2019-11-04 LAB
ANION GAP SERPL CALC-SCNC: 9 MMOL/L (ref 5–15)
CALCIUM SERPL-MCNC: 8.3 MG/DL (ref 8.5–10.1)
CHLORIDE SERPL-SCNC: 111 MMOL/L (ref 98–107)
CHOL/HDL RATIO: 2.6
CREAT SERPL-MCNC: 3.07 MG/DL (ref 0.7–1.3)
CULTURE INDICATED?: NO
EST. AVERAGE GLUCOSE BLD GHB EST-MCNC: 154 MG/DL (ref 0–126)
HBA1C MFR BLD: 7 % (ref 4.2–6.3)
HDL CHOL %: 39 % (ref 26–37)
HDL CHOLESTEROL (DIRECT): 40 MG/DL (ref 40–60)
LDL CHOLESTEROL,CALCULATED: 49 MG/DL (ref 54–169)
LDLC/HDLC SERPL: 1.2 {RATIO} (ref 0.5–3)
MICROSCOPIC: (no result)
TRIGL SERPL-MCNC: 64 MG/DL (ref 50–200)
TROPONIN I SERPL-MCNC: 0.03 NG/ML (ref 0–0.04)
TROPONIN I SERPL-MCNC: 0.03 NG/ML (ref 0–0.04)
VLDLC SERPL CALC-MCNC: 13 MG/DL (ref 0–25)

## 2019-11-04 RX ADMIN — INSULIN GLARGINE SCH UNITS: 100 INJECTION, SOLUTION SUBCUTANEOUS at 20:48

## 2019-11-04 RX ADMIN — INSULIN LISPRO SCH UNITS: 100 INJECTION, SOLUTION INTRAVENOUS; SUBCUTANEOUS at 07:00

## 2019-11-04 RX ADMIN — HEPARIN SODIUM SCH UNITS: 5000 INJECTION, SOLUTION INTRAVENOUS; SUBCUTANEOUS at 07:08

## 2019-11-04 RX ADMIN — INSULIN LISPRO SCH UNITS: 100 INJECTION, SOLUTION INTRAVENOUS; SUBCUTANEOUS at 17:04

## 2019-11-04 RX ADMIN — FOLIC ACID SCH MG: 1 TABLET ORAL at 08:41

## 2019-11-04 RX ADMIN — INSULIN LISPRO SCH UNITS: 100 INJECTION, SOLUTION INTRAVENOUS; SUBCUTANEOUS at 20:49

## 2019-11-04 RX ADMIN — ATORVASTATIN CALCIUM SCH MG: 40 TABLET, FILM COATED ORAL at 20:47

## 2019-11-04 RX ADMIN — EZETIMIBE SCH MG: 10 TABLET ORAL at 08:40

## 2019-11-04 RX ADMIN — INSULIN LISPRO SCH UNITS: 100 INJECTION, SOLUTION INTRAVENOUS; SUBCUTANEOUS at 12:13

## 2019-11-04 RX ADMIN — FUROSEMIDE SCH MG: 10 INJECTION, SOLUTION INTRAMUSCULAR; INTRAVENOUS at 17:04

## 2019-11-04 RX ADMIN — OMEPRAZOLE SCH MG: 20 CAPSULE, DELAYED RELEASE ORAL at 08:41

## 2019-11-04 RX ADMIN — DOCUSATE SODIUM 50MG AND SENNOSIDES 8.6MG SCH TAB: 8.6; 5 TABLET, FILM COATED ORAL at 08:40

## 2019-11-04 RX ADMIN — DILTIAZEM HYDROCHLORIDE SCH MG: 120 TABLET, FILM COATED ORAL at 08:39

## 2019-11-04 RX ADMIN — AMLODIPINE BESYLATE SCH MG: 5 TABLET ORAL at 08:40

## 2019-11-04 RX ADMIN — CALCITRIOL SCH MCG: 0.25 CAPSULE, LIQUID FILLED ORAL at 08:40

## 2019-11-04 RX ADMIN — HEPARIN SODIUM SCH UNITS: 5000 INJECTION, SOLUTION INTRAVENOUS; SUBCUTANEOUS at 15:11

## 2019-11-04 RX ADMIN — ASPIRIN SCH MG: 81 TABLET, COATED ORAL at 08:40

## 2019-11-04 RX ADMIN — HEPARIN SODIUM SCH UNITS: 5000 INJECTION, SOLUTION INTRAVENOUS; SUBCUTANEOUS at 20:49

## 2019-11-05 VITALS — SYSTOLIC BLOOD PRESSURE: 129 MMHG | DIASTOLIC BLOOD PRESSURE: 70 MMHG

## 2019-11-05 VITALS — DIASTOLIC BLOOD PRESSURE: 57 MMHG | SYSTOLIC BLOOD PRESSURE: 117 MMHG

## 2019-11-05 VITALS — SYSTOLIC BLOOD PRESSURE: 140 MMHG | DIASTOLIC BLOOD PRESSURE: 63 MMHG

## 2019-11-05 VITALS — SYSTOLIC BLOOD PRESSURE: 114 MMHG | DIASTOLIC BLOOD PRESSURE: 67 MMHG

## 2019-11-05 LAB
% IRON SATURATION: 23 % (ref 20–55)
ANION GAP SERPL CALC-SCNC: 8 MMOL/L (ref 5–15)
BASOPHILS # BLD AUTO: 0.03 X10^3/UL (ref 0–0.1)
BASOPHILS NFR BLD AUTO: 0 % (ref 0–1)
CALCIUM SERPL-MCNC: 8.2 MG/DL (ref 8.5–10.1)
CHLORIDE SERPL-SCNC: 109 MMOL/L (ref 98–107)
CREAT SERPL-MCNC: 3.36 MG/DL (ref 0.7–1.3)
EOSINOPHIL # BLD AUTO: 0.23 X10^3/UL (ref 0–0.4)
EOSINOPHIL NFR BLD AUTO: 3 % (ref 1–7)
ERYTHROCYTE [DISTWIDTH] IN BLOOD BY AUTOMATED COUNT: 15.3 % (ref 9.4–14.8)
EST. AVERAGE GLUCOSE BLD GHB EST-MCNC: 151 MG/DL (ref 0–126)
HBA1C MFR BLD: 6.9 % (ref 4.2–6.3)
IRON LEVEL: 53 MCG/DL (ref 65–175)
LYMPHOCYTES # BLD AUTO: 2.13 X10^3/UL (ref 1–3.4)
LYMPHOCYTES NFR BLD AUTO: 24 % (ref 22–44)
MCH RBC QN AUTO: 29.8 PG (ref 27.5–34.5)
MCHC RBC AUTO-ENTMCNC: 32.9 G/DL (ref 33.2–36.2)
MCV RBC AUTO: 90.4 FL (ref 81–97)
MD: NO
MONOCYTES # BLD AUTO: 0.44 X10^3/UL (ref 0.2–0.8)
MONOCYTES NFR BLD AUTO: 5 % (ref 2–9)
NEUTROPHILS # BLD AUTO: 6.18 X10^3/UL (ref 1.8–6.8)
NEUTROPHILS NFR BLD AUTO: 69 % (ref 42–75)
PLATELET # BLD AUTO: 168 X10^3/UL (ref 130–400)
PMV BLD AUTO: 8.3 FL (ref 7.4–10.4)
RBC # BLD AUTO: 3.57 X10^6/UL (ref 4.38–5.82)
TIBC SERPL-MCNC: 228 MCG/DL (ref 250–450)

## 2019-11-05 RX ADMIN — HEPARIN SODIUM SCH UNITS: 5000 INJECTION, SOLUTION INTRAVENOUS; SUBCUTANEOUS at 14:25

## 2019-11-05 RX ADMIN — CALCITRIOL SCH MCG: 0.25 CAPSULE, LIQUID FILLED ORAL at 09:00

## 2019-11-05 RX ADMIN — INSULIN LISPRO SCH UNITS: 100 INJECTION, SOLUTION INTRAVENOUS; SUBCUTANEOUS at 12:09

## 2019-11-05 RX ADMIN — AMLODIPINE BESYLATE SCH MG: 5 TABLET ORAL at 10:16

## 2019-11-05 RX ADMIN — ASPIRIN SCH MG: 81 TABLET, COATED ORAL at 10:16

## 2019-11-05 RX ADMIN — DILTIAZEM HYDROCHLORIDE SCH MG: 120 TABLET, FILM COATED ORAL at 10:15

## 2019-11-05 RX ADMIN — INSULIN LISPRO SCH UNITS: 100 INJECTION, SOLUTION INTRAVENOUS; SUBCUTANEOUS at 21:00

## 2019-11-05 RX ADMIN — INSULIN LISPRO SCH UNITS: 100 INJECTION, SOLUTION INTRAVENOUS; SUBCUTANEOUS at 07:00

## 2019-11-05 RX ADMIN — EZETIMIBE SCH MG: 10 TABLET ORAL at 10:15

## 2019-11-05 RX ADMIN — OMEPRAZOLE SCH MG: 20 CAPSULE, DELAYED RELEASE ORAL at 10:15

## 2019-11-05 RX ADMIN — INSULIN LISPRO SCH UNITS: 100 INJECTION, SOLUTION INTRAVENOUS; SUBCUTANEOUS at 17:28

## 2019-11-05 RX ADMIN — DOCUSATE SODIUM 50MG AND SENNOSIDES 8.6MG SCH TAB: 8.6; 5 TABLET, FILM COATED ORAL at 10:15

## 2019-11-05 RX ADMIN — INSULIN GLARGINE SCH UNITS: 100 INJECTION, SOLUTION SUBCUTANEOUS at 21:11

## 2019-11-05 RX ADMIN — FOLIC ACID SCH MG: 1 TABLET ORAL at 10:16

## 2019-11-05 RX ADMIN — ATORVASTATIN CALCIUM SCH MG: 40 TABLET, FILM COATED ORAL at 21:10

## 2019-11-05 RX ADMIN — IRON SUCROSE SCH MG: 20 INJECTION, SOLUTION INTRAVENOUS at 15:03

## 2019-11-05 RX ADMIN — HEPARIN SODIUM SCH UNITS: 5000 INJECTION, SOLUTION INTRAVENOUS; SUBCUTANEOUS at 21:11

## 2019-11-05 RX ADMIN — FUROSEMIDE SCH MG: 10 INJECTION, SOLUTION INTRAMUSCULAR; INTRAVENOUS at 17:28

## 2019-11-05 RX ADMIN — HEPARIN SODIUM SCH UNITS: 5000 INJECTION, SOLUTION INTRAVENOUS; SUBCUTANEOUS at 05:52

## 2019-11-05 RX ADMIN — FUROSEMIDE SCH MG: 10 INJECTION, SOLUTION INTRAMUSCULAR; INTRAVENOUS at 10:15

## 2019-11-06 VITALS — SYSTOLIC BLOOD PRESSURE: 107 MMHG | DIASTOLIC BLOOD PRESSURE: 63 MMHG

## 2019-11-06 VITALS — DIASTOLIC BLOOD PRESSURE: 66 MMHG | SYSTOLIC BLOOD PRESSURE: 130 MMHG

## 2019-11-06 VITALS — SYSTOLIC BLOOD PRESSURE: 128 MMHG | DIASTOLIC BLOOD PRESSURE: 63 MMHG

## 2019-11-06 LAB
ANION GAP SERPL CALC-SCNC: 6 MMOL/L (ref 5–15)
CALCIUM SERPL-MCNC: 8.1 MG/DL (ref 8.5–10.1)
CHLORIDE SERPL-SCNC: 109 MMOL/L (ref 98–107)
CREAT SERPL-MCNC: 3.52 MG/DL (ref 0.7–1.3)

## 2019-11-06 RX ADMIN — EZETIMIBE SCH MG: 10 TABLET ORAL at 09:25

## 2019-11-06 RX ADMIN — DOCUSATE SODIUM 50MG AND SENNOSIDES 8.6MG SCH TAB: 8.6; 5 TABLET, FILM COATED ORAL at 09:25

## 2019-11-06 RX ADMIN — DILTIAZEM HYDROCHLORIDE SCH MG: 120 TABLET, FILM COATED ORAL at 09:24

## 2019-11-06 RX ADMIN — ASPIRIN SCH MG: 81 TABLET, COATED ORAL at 09:25

## 2019-11-06 RX ADMIN — HEPARIN SODIUM SCH UNITS: 5000 INJECTION, SOLUTION INTRAVENOUS; SUBCUTANEOUS at 06:34

## 2019-11-06 RX ADMIN — IRON SUCROSE SCH MG: 20 INJECTION, SOLUTION INTRAVENOUS at 09:24

## 2019-11-06 RX ADMIN — FOLIC ACID SCH MG: 1 TABLET ORAL at 09:25

## 2019-11-06 RX ADMIN — OMEPRAZOLE SCH MG: 20 CAPSULE, DELAYED RELEASE ORAL at 09:25

## 2019-11-06 RX ADMIN — FUROSEMIDE SCH MG: 10 INJECTION, SOLUTION INTRAMUSCULAR; INTRAVENOUS at 06:33

## 2019-11-06 RX ADMIN — CALCITRIOL SCH MCG: 0.25 CAPSULE, LIQUID FILLED ORAL at 09:24

## 2019-11-06 RX ADMIN — AMLODIPINE BESYLATE SCH MG: 5 TABLET ORAL at 09:25

## 2019-11-06 RX ADMIN — INSULIN LISPRO SCH UNITS: 100 INJECTION, SOLUTION INTRAVENOUS; SUBCUTANEOUS at 12:05

## 2019-11-06 RX ADMIN — HEPARIN SODIUM SCH UNITS: 5000 INJECTION, SOLUTION INTRAVENOUS; SUBCUTANEOUS at 13:30

## 2019-11-06 RX ADMIN — INSULIN LISPRO SCH UNITS: 100 INJECTION, SOLUTION INTRAVENOUS; SUBCUTANEOUS at 07:00

## 2020-05-19 ENCOUNTER — OFFICE VISIT (OUTPATIENT)
Dept: CARDIOLOGY | Facility: MEDICAL CENTER | Age: 80
End: 2020-05-19
Payer: MEDICARE

## 2020-05-19 VITALS
WEIGHT: 240.6 LBS | HEART RATE: 68 BPM | HEIGHT: 68 IN | BODY MASS INDEX: 36.46 KG/M2 | OXYGEN SATURATION: 99 % | SYSTOLIC BLOOD PRESSURE: 122 MMHG | DIASTOLIC BLOOD PRESSURE: 60 MMHG

## 2020-05-19 DIAGNOSIS — E78.5 DYSLIPIDEMIA: ICD-10-CM

## 2020-05-19 DIAGNOSIS — Z95.0 CARDIAC PACEMAKER IN SITU: ICD-10-CM

## 2020-05-19 DIAGNOSIS — I48.0 PAF (PAROXYSMAL ATRIAL FIBRILLATION) (HCC): ICD-10-CM

## 2020-05-19 DIAGNOSIS — Z79.899 ENCOUNTER FOR LONG-TERM (CURRENT) USE OF HIGH-RISK MEDICATION: ICD-10-CM

## 2020-05-19 PROCEDURE — 99214 OFFICE O/P EST MOD 30 MIN: CPT | Performed by: INTERNAL MEDICINE

## 2020-05-19 RX ORDER — FUROSEMIDE 40 MG/1
TABLET ORAL
COMMUNITY
Start: 2020-04-21 | End: 2023-01-01

## 2020-05-19 RX ORDER — INSULIN ASPART 100 [IU]/ML
INJECTION, SOLUTION INTRAVENOUS; SUBCUTANEOUS
COMMUNITY
Start: 2020-04-21 | End: 2023-01-01

## 2020-05-19 ASSESSMENT — ENCOUNTER SYMPTOMS
LOSS OF CONSCIOUSNESS: 0
DEPRESSION: 0
SHORTNESS OF BREATH: 0
ORTHOPNEA: 0
PND: 0
ABDOMINAL PAIN: 0
FALLS: 0
DIZZINESS: 0
PALPITATIONS: 0

## 2020-05-19 NOTE — PROGRESS NOTES
Chief Complaint   Patient presents with   • Atrial Fibrillation   • Congestive Heart Failure       Subjective:   Kameron Elias is a 80-year-old male presenting to clinic for follow-up on atrial fibrillation.    Pertinent history:  Paroxysmal atrial fibrillation  History of bradycardia status post permanent pacemaker in 2014  Hypertension  Hyperlipidemia  Diabetes  Diastolic dysfunction  Chronic kidney disease      Patient has been doing well since his last set.  He denies any palpitations.  No cardiac symptoms.  Blood pressure has been fine.  Gets occasional lower extremity edema towards the end of the day.    He is worried about his pacemaker because he thinks the pacemaker will only last 5 years.      Past Medical History:   Diagnosis Date   • Acid reflux    • Arthritis    • Ataxia 12/31/2013   • Benign hypertensive heart disease without heart failure 5/25/2012   • Diabetes    • Fatigue 5/25/2012   • HTN (hypertension), malignant 5/25/2012   • Hyperlipidemia    • Hypertension    • Neuropathic pain 12/31/2013   • Obesity 5/25/2012   • Pain 6/29/12    b/l knees     Past Surgical History:   Procedure Laterality Date   • KNEE ARTHROPLASTY TOTAL  7/12/2012    Performed by ISABELLA MARK at SURGERY [facility]   • CATARACT EXTRACTION WITH IOL       Family History   Problem Relation Age of Onset   • Hypertension Mother    • Lung Disease Father         COPD   • Diabetes Unknown    • Heart Disease Unknown      Social History     Socioeconomic History   • Marital status:      Spouse name: Not on file   • Number of children: Not on file   • Years of education: Not on file   • Highest education level: Not on file   Occupational History   • Not on file   Social Needs   • Financial resource strain: Not on file   • Food insecurity     Worry: Not on file     Inability: Not on file   • Transportation needs     Medical: Not on file     Non-medical: Not on file   Tobacco Use   • Smoking status: Former Smoker     Packs/day:  1.00     Years: 15.00     Pack years: 15.00     Types: Cigarettes     Last attempt to quit: 1971     Years since quittin.3   • Smokeless tobacco: Never Used   • Tobacco comment: quit 40yrs ago   Substance and Sexual Activity   • Alcohol use: Yes     Alcohol/week: 10.5 oz     Types: 21 Standard drinks or equivalent per week   • Drug use: No   • Sexual activity: Not on file   Lifestyle   • Physical activity     Days per week: Not on file     Minutes per session: Not on file   • Stress: Not on file   Relationships   • Social connections     Talks on phone: Not on file     Gets together: Not on file     Attends Gnosticism service: Not on file     Active member of club or organization: Not on file     Attends meetings of clubs or organizations: Not on file     Relationship status: Not on file   • Intimate partner violence     Fear of current or ex partner: Not on file     Emotionally abused: Not on file     Physically abused: Not on file     Forced sexual activity: Not on file   Other Topics Concern   • Not on file   Social History Narrative   • Not on file     No Known Allergies  Outpatient Encounter Medications as of 2020   Medication Sig Dispense Refill   • NOVOLOG FLEXPEN 100 UNIT/ML solution for injection      • furosemide (LASIX) 40 MG Tab      • aspirin EC (ECOTRIN) 81 MG Tablet Delayed Response Take 81 mg by mouth every day.     • Docusate Sodium (STOOL SOFTENER LAXATIVE PO) Take  by mouth.     • lisinopril (PRINIVIL) 10 MG Tab      • DILTIAZem CD (TIAZAC) 240 MG SR capsule Take 1 Cap by mouth 2 Times a Day. (Patient taking differently: Take 240 mg by mouth every day.) 180 Cap 3   • insulin aspart (NOVOLOG) 100 UNIT/ML Solution Inject  as instructed 3 times a day before meals.     • TRESIBA FLEXTOUCH 200 UNIT/ML Solution Pen-injector      • calcitRIOL (ROCALTROL) 0.25 MCG CAPS Take 0.25 mcg by mouth every day.     • ezetimibe (ZETIA) 10 MG TABS Take 10 mg by mouth every day.     • folic acid  "(FOLVITE) 1 MG TABS Take 1 mg by mouth every day.     • Cholecalciferol (VITAMIN D3) 1000 UNIT TABS Take 1,000 Units by mouth every day.     • atorvastatin (LIPITOR) 40 MG TABS Take 40 mg by mouth every day.     • omeprazole (PRILOSEC) 20 MG CPDR Take 20 mg by mouth every day.     • [DISCONTINUED] aspirin EC (ECOTRIN) 325 MG TBEC Take 1 Tab by mouth every day. 90 Tab 6   • sitagliptin (JANUVIA) 50 MG TABS Take 50 mg by mouth every day.     • [DISCONTINUED] furosemide (LASIX) 20 MG TABS Take 40 mg by mouth every day.       No facility-administered encounter medications on file as of 5/19/2020.      Review of Systems   Constitutional: Negative for malaise/fatigue.   Respiratory: Negative for shortness of breath.    Cardiovascular: Positive for leg swelling. Negative for chest pain, palpitations, orthopnea and PND.   Gastrointestinal: Negative for abdominal pain.   Musculoskeletal: Negative for falls.   Neurological: Negative for dizziness and loss of consciousness.   Psychiatric/Behavioral: Negative for depression.   All other systems reviewed and are negative.       Objective:   /60 (BP Location: Left arm, Patient Position: Sitting, BP Cuff Size: Adult)   Pulse 68   Ht 1.727 m (5' 8\")   Wt 109.1 kg (240 lb 9.6 oz)   SpO2 99%   BMI 36.58 kg/m²     Physical Exam   Constitutional: He is oriented to person, place, and time. He appears well-developed and well-nourished. No distress.   HENT:   Head: Normocephalic and atraumatic.   Eyes: Conjunctivae are normal.   Neck: Neck supple.   Neurological: He is alert and oriented to person, place, and time.   Skin: No rash noted. He is not diaphoretic.   Psychiatric: He has a normal mood and affect. His behavior is normal. Judgment and thought content normal.     Assessment:     1. PAF (paroxysmal atrial fibrillation) (HCC)  CBC WITHOUT DIFFERENTIAL    Basic Metabolic Panel   2. Cardiac pacemaker in situ     3. Dyslipidemia  Lipid Profile   4. Encounter for long-term " (current) use of high-risk medication         Medical Decision Making:  Today's Assessment / Status / Plan:     Patient continues to be asymptomatic from his A. fib standpoint.  We discussed the benefits of anticoagulation again however patient worries about bleeding and does not want any anticoagulation.  He continues to be on aspirin.    We will request of his pacemaker can be interrogated today.  Would like to minimize patient's exposure given the current pandemic.    Blood pressure is at goal.  Continue current medications.    Return to clinic in 1 year or earlier if needed.      Thank you for allowing me to participate in the care of this patient. Please do not hesitate to contact me with any questions.    Patito Ayon MD  Cardiologist  [facility]      PLEASE NOTE: This dictation was created using voice recognition software.

## 2020-05-19 NOTE — LETTER
[facility]   [address] [place] [address]  [place] Gomes [address]  Phone: [phone]  Fax: [phone]              Kameron Elias  1940    Encounter Date: 5/19/2020    Patito Ayon M.D.          PROGRESS NOTE:  Chief Complaint   Patient presents with   • Atrial Fibrillation   • Congestive Heart Failure       Subjective:   Kameron Elias is a 80-year-old male presenting to clinic for follow-up on atrial fibrillation.    Pertinent history:  Paroxysmal atrial fibrillation  History of bradycardia status post permanent pacemaker in 2014  Hypertension  Hyperlipidemia  Diabetes  Diastolic dysfunction  Chronic kidney disease      Patient has been doing well since his last set.  He denies any palpitations.  No cardiac symptoms.  Blood pressure has been fine.  Gets occasional lower extremity edema towards the end of the day.    He is worried about his pacemaker because he thinks the pacemaker will only last 5 years.      Past Medical History:   Diagnosis Date   • Acid reflux    • Arthritis    • Ataxia 12/31/2013   • Benign hypertensive heart disease without heart failure 5/25/2012   • Diabetes    • Fatigue 5/25/2012   • HTN (hypertension), malignant 5/25/2012   • Hyperlipidemia    • Hypertension    • Neuropathic pain 12/31/2013   • Obesity 5/25/2012   • Pain 6/29/12    b/l knees     Past Surgical History:   Procedure Laterality Date   • KNEE ARTHROPLASTY TOTAL  7/12/2012    Performed by ISABELLA MARK at SURGERY [facility]   • CATARACT EXTRACTION WITH IOL       Family History   Problem Relation Age of Onset   • Hypertension Mother    • Lung Disease Father         COPD   • Diabetes Unknown    • Heart Disease Unknown      Social History     Socioeconomic History   • Marital status:      Spouse name: Not on file   • Number of children: Not on file   • Years of education: Not on file   • Highest education level: Not on file   Occupational History   • Not on file   Social Needs     • Financial resource strain: Not on file   • Food insecurity     Worry: Not on file     Inability: Not on file   • Transportation needs     Medical: Not on file     Non-medical: Not on file   Tobacco Use   • Smoking status: Former Smoker     Packs/day: 1.00     Years: 15.00     Pack years: 15.00     Types: Cigarettes     Last attempt to quit: 1971     Years since quittin.3   • Smokeless tobacco: Never Used   • Tobacco comment: quit 40yrs ago   Substance and Sexual Activity   • Alcohol use: Yes     Alcohol/week: 10.5 oz     Types: 21 Standard drinks or equivalent per week   • Drug use: No   • Sexual activity: Not on file   Lifestyle   • Physical activity     Days per week: Not on file     Minutes per session: Not on file   • Stress: Not on file   Relationships   • Social connections     Talks on phone: Not on file     Gets together: Not on file     Attends Rastafarian service: Not on file     Active member of club or organization: Not on file     Attends meetings of clubs or organizations: Not on file     Relationship status: Not on file   • Intimate partner violence     Fear of current or ex partner: Not on file     Emotionally abused: Not on file     Physically abused: Not on file     Forced sexual activity: Not on file   Other Topics Concern   • Not on file   Social History Narrative   • Not on file     No Known Allergies  Outpatient Encounter Medications as of 2020   Medication Sig Dispense Refill   • NOVOLOG FLEXPEN 100 UNIT/ML solution for injection      • furosemide (LASIX) 40 MG Tab      • aspirin EC (ECOTRIN) 81 MG Tablet Delayed Response Take 81 mg by mouth every day.     • Docusate Sodium (STOOL SOFTENER LAXATIVE PO) Take  by mouth.     • lisinopril (PRINIVIL) 10 MG Tab      • DILTIAZem CD (TIAZAC) 240 MG SR capsule Take 1 Cap by mouth 2 Times a Day. (Patient taking differently: Take 240 mg by mouth every day.) 180 Cap 3   • insulin aspart (NOVOLOG) 100 UNIT/ML Solution Inject  as instructed  "3 times a day before meals.     • TRESIBA FLEXTOUCH 200 UNIT/ML Solution Pen-injector      • calcitRIOL (ROCALTROL) 0.25 MCG CAPS Take 0.25 mcg by mouth every day.     • ezetimibe (ZETIA) 10 MG TABS Take 10 mg by mouth every day.     • folic acid (FOLVITE) 1 MG TABS Take 1 mg by mouth every day.     • Cholecalciferol (VITAMIN D3) 1000 UNIT TABS Take 1,000 Units by mouth every day.     • atorvastatin (LIPITOR) 40 MG TABS Take 40 mg by mouth every day.     • omeprazole (PRILOSEC) 20 MG CPDR Take 20 mg by mouth every day.     • [DISCONTINUED] aspirin EC (ECOTRIN) 325 MG TBEC Take 1 Tab by mouth every day. 90 Tab 6   • sitagliptin (JANUVIA) 50 MG TABS Take 50 mg by mouth every day.     • [DISCONTINUED] furosemide (LASIX) 20 MG TABS Take 40 mg by mouth every day.       No facility-administered encounter medications on file as of 5/19/2020.      Review of Systems   Constitutional: Negative for malaise/fatigue.   Respiratory: Negative for shortness of breath.    Cardiovascular: Positive for leg swelling. Negative for chest pain, palpitations, orthopnea and PND.   Gastrointestinal: Negative for abdominal pain.   Musculoskeletal: Negative for falls.   Neurological: Negative for dizziness and loss of consciousness.   Psychiatric/Behavioral: Negative for depression.   All other systems reviewed and are negative.       Objective:   /60 (BP Location: Left arm, Patient Position: Sitting, BP Cuff Size: Adult)   Pulse 68   Ht 1.727 m (5' 8\")   Wt 109.1 kg (240 lb 9.6 oz)   SpO2 99%   BMI 36.58 kg/m²     Physical Exam   Constitutional: He is oriented to person, place, and time. He appears well-developed and well-nourished. No distress.   HENT:   Head: Normocephalic and atraumatic.   Eyes: Conjunctivae are normal.   Neck: Neck supple.   Neurological: He is alert and oriented to person, place, and time.   Skin: No rash noted. He is not diaphoretic.   Psychiatric: He has a normal mood and affect. His behavior is normal. " Judgment and thought content normal.     Assessment:     1. PAF (paroxysmal atrial fibrillation) (HCC)  CBC WITHOUT DIFFERENTIAL    Basic Metabolic Panel   2. Cardiac pacemaker in situ     3. Dyslipidemia  Lipid Profile   4. Encounter for long-term (current) use of high-risk medication         Medical Decision Making:  Today's Assessment / Status / Plan:     Patient continues to be asymptomatic from his A. fib standpoint.  We discussed the benefits of anticoagulation again however patient worries about bleeding and does not want any anticoagulation.  He continues to be on aspirin.    We will request of his pacemaker can be interrogated today.  Would like to minimize patient's exposure given the current pandemic.    Blood pressure is at goal.  Continue current medications.    Return to clinic in 1 year or earlier if needed.      Thank you for allowing me to participate in the care of this patient. Please do not hesitate to contact me with any questions.    Patito Ayon MD  Cardiologist  [facility]      PLEASE NOTE: This dictation was created using voice recognition software.         Dorian De M.D.  [address] [place] [id number]  VIA Facsimile: [phone]

## 2020-05-22 ENCOUNTER — NON-PROVIDER VISIT (OUTPATIENT)
Dept: CARDIOLOGY | Facility: MEDICAL CENTER | Age: 80
End: 2020-05-22
Payer: MEDICARE

## 2020-07-21 LAB
BUN SERPL-MCNC: 74 MG/DL (ref 8–27)
BUN/CREAT SERPL: 18 (ref 10–24)
CALCIUM SERPL-MCNC: 9 MG/DL (ref 8.6–10.2)
CHLORIDE SERPL-SCNC: 100 MMOL/L (ref 96–106)
CHOLEST SERPL-MCNC: 105 MG/DL (ref 100–199)
CO2 SERPL-SCNC: 21 MMOL/L (ref 20–29)
CREAT SERPL-MCNC: 4.1 MG/DL (ref 0.76–1.27)
ERYTHROCYTE [DISTWIDTH] IN BLOOD BY AUTOMATED COUNT: 13.2 % (ref 11.6–15.4)
GLUCOSE SERPL-MCNC: 113 MG/DL (ref 65–99)
HCT VFR BLD AUTO: 34.8 % (ref 37.5–51)
HDLC SERPL-MCNC: 38 MG/DL
HGB BLD-MCNC: 11.5 G/DL (ref 13–17.7)
LABORATORY COMMENT REPORT: ABNORMAL
LDLC SERPL CALC-MCNC: 52 MG/DL (ref 0–99)
MCH RBC QN AUTO: 29.9 PG (ref 26.6–33)
MCHC RBC AUTO-ENTMCNC: 33 G/DL (ref 31.5–35.7)
MCV RBC AUTO: 90 FL (ref 79–97)
NRBC BLD AUTO-RTO: ABNORMAL %
PLATELET # BLD AUTO: 189 X10E3/UL (ref 150–450)
POTASSIUM SERPL-SCNC: 5.3 MMOL/L (ref 3.5–5.2)
RBC # BLD AUTO: 3.85 X10E6/UL (ref 4.14–5.8)
SODIUM SERPL-SCNC: 142 MMOL/L (ref 134–144)
TRIGL SERPL-MCNC: 77 MG/DL (ref 0–149)
VLDLC SERPL CALC-MCNC: 15 MG/DL (ref 5–40)
WBC # BLD AUTO: 9.3 X10E3/UL (ref 3.4–10.8)

## 2020-08-03 ENCOUNTER — TELEPHONE (OUTPATIENT)
Dept: CARDIOLOGY | Facility: MEDICAL CENTER | Age: 80
End: 2020-08-03

## 2020-08-03 DIAGNOSIS — I10 ESSENTIAL HYPERTENSION: ICD-10-CM

## 2020-08-03 DIAGNOSIS — N18.4 CKD (CHRONIC KIDNEY DISEASE), STAGE IV (HCC): ICD-10-CM

## 2020-08-03 DIAGNOSIS — Z79.899 ENCOUNTER FOR LONG-TERM (CURRENT) USE OF HIGH-RISK MEDICATION: ICD-10-CM

## 2020-08-03 NOTE — TELEPHONE ENCOUNTER
"Result Notes for CBC WITHOUT DIFFERENTIAL     Notes recorded by Patito Ayon M.D. on 8/2/2020 at 5:04 PM PDT   Labs reviewed.  Creatinine has doubled. Please call the patient and check in on him. Please have him get a repeat Chem-7 ASAP. Have him hold his lasix and lisinopril.   Thank you   AA          Called pt to discuss. Pt stated he is reluctant to hold his medications because \"that's my lifeline, I won't be able to breathe.\" Pt had seen his nephrologist on 07/29/20, no new recommendations or changes were received, and says \"They said everything was good.\"    Pt denies any recent symptoms. Advised recommended repeat BMP. Pt goes to [facility] on [facility]. Advised will fax lab order there, verbalized understanding, appreciative of call.    Lab order faxed to [facility] on [facility]. [phone], received receipt for confirmation.  "

## 2020-08-03 NOTE — TELEPHONE ENCOUNTER
Patito Ayon M.D.  You 2 hours ago (12:20 PM)       Okay.  Thank you.  Can we request the records from the nephrology visit recently?  Otherwise we will wait for Chem-7 results.        Called Dr. Dorian Watson's office at [facility], spoke with Angie. Requested recent progress notes from 07/29/20, will await fax response.    Dr. Watson  [phone]

## 2020-08-03 NOTE — TELEPHONE ENCOUNTER
Received records from [facility], given to AA for review. Copy sent to scanning.

## 2021-01-14 DIAGNOSIS — Z23 NEED FOR VACCINATION: ICD-10-CM

## 2021-06-20 PROBLEM — G89.29 CHRONIC PAIN: Status: ACTIVE | Noted: 2021-06-20

## 2021-06-20 PROBLEM — N18.6 ESRD (END STAGE RENAL DISEASE) (HCC): Status: ACTIVE | Noted: 2021-06-20

## 2021-06-20 PROBLEM — N18.9 ANEMIA DUE TO CHRONIC KIDNEY DISEASE: Status: ACTIVE | Noted: 2021-06-20

## 2021-06-20 PROBLEM — J96.21 ACUTE ON CHRONIC RESPIRATORY FAILURE WITH HYPOXIA (HCC): Status: ACTIVE | Noted: 2021-06-20

## 2021-06-20 PROBLEM — J44.1 CHRONIC OBSTRUCTIVE PULMONARY DISEASE WITH ACUTE EXACERBATION (HCC): Status: ACTIVE | Noted: 2021-06-20

## 2021-06-20 PROBLEM — R06.00 DYSPNEA: Status: ACTIVE | Noted: 2021-06-20

## 2021-06-20 PROBLEM — D63.1 ANEMIA DUE TO CHRONIC KIDNEY DISEASE: Status: ACTIVE | Noted: 2021-06-20

## 2023-01-01 ENCOUNTER — HOSPITAL ENCOUNTER (EMERGENCY)
Facility: MEDICAL CENTER | Age: 83
End: 2023-06-13
Attending: EMERGENCY MEDICINE
Payer: MEDICARE

## 2023-01-01 ENCOUNTER — APPOINTMENT (OUTPATIENT)
Dept: RADIOLOGY | Facility: MEDICAL CENTER | Age: 83
End: 2023-01-01
Attending: EMERGENCY MEDICINE
Payer: MEDICARE

## 2023-01-01 ENCOUNTER — HOSPITAL ENCOUNTER (EMERGENCY)
Facility: MEDICAL CENTER | Age: 83
End: 2023-06-01
Attending: EMERGENCY MEDICINE
Payer: MEDICARE

## 2023-01-01 ENCOUNTER — HOSPITAL ENCOUNTER (OUTPATIENT)
Dept: RADIOLOGY | Facility: MEDICAL CENTER | Age: 83
Setting detail: OUTPATIENT SURGERY
End: 2023-12-29
Attending: STUDENT IN AN ORGANIZED HEALTH CARE EDUCATION/TRAINING PROGRAM
Payer: MEDICARE

## 2023-01-01 ENCOUNTER — HOSPITAL ENCOUNTER (EMERGENCY)
Facility: MEDICAL CENTER | Age: 83
End: 2023-06-15
Payer: MEDICARE

## 2023-01-01 ENCOUNTER — TELEPHONE (OUTPATIENT)
Dept: RADIOLOGY | Facility: MEDICAL CENTER | Age: 83
End: 2023-01-01
Payer: MEDICARE

## 2023-01-01 ENCOUNTER — HOSPITAL ENCOUNTER (EMERGENCY)
Facility: MEDICAL CENTER | Age: 83
End: 2023-04-06
Attending: EMERGENCY MEDICINE
Payer: MEDICARE

## 2023-01-01 ENCOUNTER — APPOINTMENT (OUTPATIENT)
Dept: ADMISSIONS | Facility: MEDICAL CENTER | Age: 83
End: 2023-01-01
Attending: STUDENT IN AN ORGANIZED HEALTH CARE EDUCATION/TRAINING PROGRAM
Payer: MEDICARE

## 2023-01-01 VITALS
HEART RATE: 82 BPM | RESPIRATION RATE: 19 BRPM | BODY MASS INDEX: 34.72 KG/M2 | OXYGEN SATURATION: 93 % | WEIGHT: 216.05 LBS | SYSTOLIC BLOOD PRESSURE: 108 MMHG | TEMPERATURE: 97.6 F | DIASTOLIC BLOOD PRESSURE: 54 MMHG | HEIGHT: 66 IN

## 2023-01-01 VITALS
BODY MASS INDEX: 30.07 KG/M2 | HEIGHT: 68 IN | OXYGEN SATURATION: 100 % | DIASTOLIC BLOOD PRESSURE: 51 MMHG | RESPIRATION RATE: 16 BRPM | TEMPERATURE: 98.8 F | SYSTOLIC BLOOD PRESSURE: 104 MMHG | HEART RATE: 64 BPM | WEIGHT: 198.41 LBS

## 2023-01-01 VITALS
DIASTOLIC BLOOD PRESSURE: 64 MMHG | WEIGHT: 211 LBS | TEMPERATURE: 97.3 F | SYSTOLIC BLOOD PRESSURE: 123 MMHG | HEART RATE: 70 BPM | HEIGHT: 68 IN | BODY MASS INDEX: 31.98 KG/M2 | RESPIRATION RATE: 16 BRPM | OXYGEN SATURATION: 96 %

## 2023-01-01 DIAGNOSIS — L03.221 CELLULITIS OF NECK: ICD-10-CM

## 2023-01-01 DIAGNOSIS — M79.605 PAIN OF LEFT LOWER EXTREMITY: ICD-10-CM

## 2023-01-01 DIAGNOSIS — S68.112A AMPUTATION OF RIGHT MIDDLE FINGER: ICD-10-CM

## 2023-01-01 DIAGNOSIS — S81.802A OPEN WOUND OF LEFT LOWER EXTREMITY, INITIAL ENCOUNTER: ICD-10-CM

## 2023-01-01 DIAGNOSIS — L03.116 CELLULITIS OF LEFT ANTERIOR LOWER LEG: ICD-10-CM

## 2023-01-01 DIAGNOSIS — E04.9 ENLARGED THYROID: ICD-10-CM

## 2023-01-01 DIAGNOSIS — T14.8XXA OPEN WOUND: ICD-10-CM

## 2023-01-01 DIAGNOSIS — L02.91 ABSCESS: ICD-10-CM

## 2023-01-01 DIAGNOSIS — T81.30XA WOUND DEHISCENCE: ICD-10-CM

## 2023-01-01 LAB
ANION GAP SERPL CALC-SCNC: 11 MMOL/L (ref 7–16)
BACTERIA BLD CULT: NORMAL
BACTERIA WND AEROBE CULT: ABNORMAL
BASOPHILS # BLD AUTO: 0.2 % (ref 0–1.8)
BASOPHILS # BLD: 0.03 K/UL (ref 0–0.12)
BUN SERPL-MCNC: 27 MG/DL (ref 8–22)
CALCIUM SERPL-MCNC: 8.8 MG/DL (ref 8.4–10.2)
CHLORIDE SERPL-SCNC: 98 MMOL/L (ref 96–112)
CO2 SERPL-SCNC: 23 MMOL/L (ref 20–33)
CREAT SERPL-MCNC: 3.34 MG/DL (ref 0.5–1.4)
EOSINOPHIL # BLD AUTO: 0.12 K/UL (ref 0–0.51)
EOSINOPHIL NFR BLD: 0.7 % (ref 0–6.9)
ERYTHROCYTE [DISTWIDTH] IN BLOOD BY AUTOMATED COUNT: 54.9 FL (ref 35.9–50)
GFR SERPLBLD CREATININE-BSD FMLA CKD-EPI: 18 ML/MIN/1.73 M 2
GLUCOSE SERPL-MCNC: 218 MG/DL (ref 65–99)
GRAM STN SPEC: ABNORMAL
GRAM STN SPEC: NORMAL
HCT VFR BLD AUTO: 35.7 % (ref 42–52)
HGB BLD-MCNC: 11.4 G/DL (ref 14–18)
IMM GRANULOCYTES # BLD AUTO: 0.13 K/UL (ref 0–0.11)
IMM GRANULOCYTES NFR BLD AUTO: 0.8 % (ref 0–0.9)
LYMPHOCYTES # BLD AUTO: 1.33 K/UL (ref 1–4.8)
LYMPHOCYTES NFR BLD: 8.1 % (ref 22–41)
MCH RBC QN AUTO: 30.2 PG (ref 27–33)
MCHC RBC AUTO-ENTMCNC: 31.9 G/DL (ref 33.7–35.3)
MCV RBC AUTO: 94.4 FL (ref 81.4–97.8)
MONOCYTES # BLD AUTO: 0.69 K/UL (ref 0–0.85)
MONOCYTES NFR BLD AUTO: 4.2 % (ref 0–13.4)
NEUTROPHILS # BLD AUTO: 14.09 K/UL (ref 1.82–7.42)
NEUTROPHILS NFR BLD: 86 % (ref 44–72)
NRBC # BLD AUTO: 0 K/UL
NRBC BLD-RTO: 0 /100 WBC
PLATELET # BLD AUTO: 178 K/UL (ref 164–446)
PMV BLD AUTO: 10.5 FL (ref 9–12.9)
POTASSIUM SERPL-SCNC: 4.4 MMOL/L (ref 3.6–5.5)
RBC # BLD AUTO: 3.78 M/UL (ref 4.7–6.1)
SIGNIFICANT IND 70042: ABNORMAL
SIGNIFICANT IND 70042: NORMAL
SIGNIFICANT IND 70042: NORMAL
SITE SITE: ABNORMAL
SITE SITE: NORMAL
SITE SITE: NORMAL
SODIUM SERPL-SCNC: 132 MMOL/L (ref 135–145)
SOURCE SOURCE: ABNORMAL
SOURCE SOURCE: NORMAL
SOURCE SOURCE: NORMAL
WBC # BLD AUTO: 16.4 K/UL (ref 4.8–10.8)

## 2023-01-01 PROCEDURE — 36415 COLL VENOUS BLD VENIPUNCTURE: CPT

## 2023-01-01 PROCEDURE — 99284 EMERGENCY DEPT VISIT MOD MDM: CPT

## 2023-01-01 PROCEDURE — 700102 HCHG RX REV CODE 250 W/ 637 OVERRIDE(OP): Performed by: EMERGENCY MEDICINE

## 2023-01-01 PROCEDURE — A9270 NON-COVERED ITEM OR SERVICE: HCPCS | Performed by: EMERGENCY MEDICINE

## 2023-01-01 PROCEDURE — 700101 HCHG RX REV CODE 250: Performed by: EMERGENCY MEDICINE

## 2023-01-01 PROCEDURE — 87186 SC STD MICRODIL/AGAR DIL: CPT

## 2023-01-01 PROCEDURE — 73200 CT UPPER EXTREMITY W/O DYE: CPT | Mod: RT

## 2023-01-01 PROCEDURE — 85025 COMPLETE CBC W/AUTO DIFF WBC: CPT

## 2023-01-01 PROCEDURE — 70491 CT SOFT TISSUE NECK W/DYE: CPT

## 2023-01-01 PROCEDURE — 87076 CULTURE ANAEROBE IDENT EACH: CPT

## 2023-01-01 PROCEDURE — 87077 CULTURE AEROBIC IDENTIFY: CPT | Mod: 91

## 2023-01-01 PROCEDURE — 87040 BLOOD CULTURE FOR BACTERIA: CPT

## 2023-01-01 PROCEDURE — 87070 CULTURE OTHR SPECIMN AEROBIC: CPT

## 2023-01-01 PROCEDURE — 700111 HCHG RX REV CODE 636 W/ 250 OVERRIDE (IP): Performed by: EMERGENCY MEDICINE

## 2023-01-01 PROCEDURE — 96365 THER/PROPH/DIAG IV INF INIT: CPT

## 2023-01-01 PROCEDURE — 700117 HCHG RX CONTRAST REV CODE 255: Performed by: EMERGENCY MEDICINE

## 2023-01-01 PROCEDURE — 80048 BASIC METABOLIC PNL TOTAL CA: CPT

## 2023-01-01 PROCEDURE — 87205 SMEAR GRAM STAIN: CPT

## 2023-01-01 PROCEDURE — 87147 CULTURE TYPE IMMUNOLOGIC: CPT

## 2023-01-01 PROCEDURE — 700105 HCHG RX REV CODE 258: Performed by: EMERGENCY MEDICINE

## 2023-01-01 PROCEDURE — 303977 HCHG I & D

## 2023-01-01 RX ORDER — POLYETHYLENE GLYCOL 3350 17 G/17G
17 POWDER, FOR SOLUTION ORAL DAILY
COMMUNITY

## 2023-01-01 RX ORDER — CEPHALEXIN 500 MG/1
500 CAPSULE ORAL 4 TIMES DAILY
Qty: 20 CAPSULE | Refills: 0 | Status: SHIPPED | OUTPATIENT
Start: 2023-01-01 | End: 2023-01-01

## 2023-01-01 RX ORDER — ACETAMINOPHEN 325 MG/1
650 TABLET ORAL ONCE
Status: COMPLETED | OUTPATIENT
Start: 2023-01-01 | End: 2023-01-01

## 2023-01-01 RX ORDER — CYCLOBENZAPRINE HCL 5 MG
5-10 TABLET ORAL 3 TIMES DAILY PRN
Qty: 20 TABLET | Refills: 0 | Status: ON HOLD | OUTPATIENT
Start: 2023-01-01 | End: 2024-01-01

## 2023-01-01 RX ORDER — CEPHALEXIN 250 MG/1
500 CAPSULE ORAL ONCE
Status: COMPLETED | OUTPATIENT
Start: 2023-01-01 | End: 2023-01-01

## 2023-01-01 RX ORDER — CEPHALEXIN 250 MG/1
250 CAPSULE ORAL 2 TIMES DAILY
Qty: 14 CAPSULE | Refills: 0 | Status: SHIPPED | OUTPATIENT
Start: 2023-01-01 | End: 2023-01-01

## 2023-01-01 RX ORDER — AMOXICILLIN 500 MG/1
500 CAPSULE ORAL DAILY
Qty: 7 CAPSULE | Refills: 0 | Status: ON HOLD | OUTPATIENT
Start: 2023-01-01 | End: 2024-01-01

## 2023-01-01 RX ORDER — DOXYCYCLINE 100 MG/1
100 TABLET ORAL ONCE
Status: COMPLETED | OUTPATIENT
Start: 2023-01-01 | End: 2023-01-01

## 2023-01-01 RX ORDER — DILTIAZEM HYDROCHLORIDE 240 MG/1
240 CAPSULE, COATED, EXTENDED RELEASE ORAL DAILY
Status: ON HOLD | COMMUNITY
End: 2024-01-01

## 2023-01-01 RX ORDER — DOXYCYCLINE 100 MG/1
100 CAPSULE ORAL 2 TIMES DAILY
Qty: 14 CAPSULE | Refills: 0 | Status: SHIPPED | OUTPATIENT
Start: 2023-01-01 | End: 2023-01-01

## 2023-01-01 RX ORDER — AMOXICILLIN AND CLAVULANATE POTASSIUM 500; 125 MG/1; MG/1
1 TABLET, FILM COATED ORAL ONCE
Status: COMPLETED | OUTPATIENT
Start: 2023-01-01 | End: 2023-01-01

## 2023-01-01 RX ORDER — SULFAMETHOXAZOLE AND TRIMETHOPRIM 800; 160 MG/1; MG/1
1 TABLET ORAL 2 TIMES DAILY
Qty: 10 TABLET | Refills: 0 | Status: SHIPPED | OUTPATIENT
Start: 2023-01-01 | End: 2023-01-01

## 2023-01-01 RX ORDER — SULFAMETHOXAZOLE AND TRIMETHOPRIM 800; 160 MG/1; MG/1
1 TABLET ORAL ONCE
Status: COMPLETED | OUTPATIENT
Start: 2023-01-01 | End: 2023-01-01

## 2023-01-01 RX ORDER — CEPHALEXIN 500 MG/1
500 CAPSULE ORAL 3 TIMES DAILY
Qty: 21 CAPSULE | Refills: 0 | Status: SHIPPED | OUTPATIENT
Start: 2023-01-01 | End: 2023-01-01

## 2023-01-01 RX ORDER — LIDOCAINE HYDROCHLORIDE AND EPINEPHRINE BITARTRATE 20; .01 MG/ML; MG/ML
20 INJECTION, SOLUTION SUBCUTANEOUS ONCE
Status: COMPLETED | OUTPATIENT
Start: 2023-01-01 | End: 2023-01-01

## 2023-01-01 RX ORDER — CYCLOBENZAPRINE HCL 10 MG
5 TABLET ORAL ONCE
Status: COMPLETED | OUTPATIENT
Start: 2023-01-01 | End: 2023-01-01

## 2023-01-01 RX ORDER — AMOXICILLIN AND CLAVULANATE POTASSIUM 500; 125 MG/1; MG/1
1 TABLET, FILM COATED ORAL DAILY
Qty: 6 TABLET | Refills: 0 | Status: SHIPPED | OUTPATIENT
Start: 2023-01-01 | End: 2023-01-01

## 2023-01-01 RX ADMIN — CEPHALEXIN 500 MG: 250 CAPSULE ORAL at 17:52

## 2023-01-01 RX ADMIN — CYCLOBENZAPRINE 5 MG: 10 TABLET, FILM COATED ORAL at 15:24

## 2023-01-01 RX ADMIN — AMPICILLIN AND SULBACTAM 3 G: 1; 2 INJECTION, POWDER, FOR SOLUTION INTRAMUSCULAR; INTRAVENOUS at 15:19

## 2023-01-01 RX ADMIN — SULFAMETHOXAZOLE AND TRIMETHOPRIM 1 TABLET: 800; 160 TABLET ORAL at 17:52

## 2023-01-01 RX ADMIN — ACETAMINOPHEN 650 MG: 325 TABLET, FILM COATED ORAL at 16:16

## 2023-01-01 RX ADMIN — DOXYCYCLINE 100 MG: 100 TABLET, FILM COATED ORAL at 16:17

## 2023-01-01 RX ADMIN — IOHEXOL 80 ML: 350 INJECTION, SOLUTION INTRAVENOUS at 14:33

## 2023-01-01 RX ADMIN — LIDOCAINE HYDROCHLORIDE AND EPINEPHRINE 20 ML: 20; 10 INJECTION, SOLUTION INFILTRATION; PERINEURAL at 15:15

## 2023-01-01 RX ADMIN — AMOXICILLIN AND CLAVULANATE POTASSIUM 1 TABLET: 500; 125 TABLET, FILM COATED ORAL at 15:24

## 2023-01-01 ASSESSMENT — FIBROSIS 4 INDEX
FIB4 SCORE: 2.85
FIB4 SCORE: 5.68
FIB4 SCORE: 7.8

## 2023-04-06 NOTE — ED PROVIDER NOTES
ED Provider Note    CHIEF COMPLAINT  Chief Complaint   Patient presents with    Abscess     Abscess to back of neck for the last 2 weeks        EXTERNAL RECORDS REVIEWED  Inpatient Notes -patient was admitted to [facility] in October of last year for shortness of breath and chest discomfort.  He has a history of ESRD on hemodialysis and type 2 diabetes.  He was placed on vancomycin for recent C. difficile infection and was initially started on Zosyn and switched to cefazolin by ID.    HPI/ROS  LIMITATION TO HISTORY   Select: : None  OUTSIDE HISTORIAN(S):  None    Kameron Elias is a 82 y.o. male who presents with a chief complaint of abscess that has been present to the back of his neck for the past 2 weeks.  He notes that it has worsened significantly within the past several days and is now uncomfortable at all times.  He does have a history of diabetes but denies any fevers.  He has not tried any medication for his symptoms.  He states that this is happened to him before but typically is able to squeeze the region, express pus, and the mass will resolve.  He does have a history of ESRD on Tuesday/Thursday/Saturday dialysis, most recently underwent a full run today.    PAST MEDICAL HISTORY   has a past medical history of Acid reflux, Arthritis, Ataxia (12/31/2013), Benign hypertensive heart disease without heart failure (5/25/2012), Diabetes, Fatigue (5/25/2012), HTN (hypertension), malignant (5/25/2012), Hyperlipidemia, Hypertension, Neuropathic pain (12/31/2013), Obesity (5/25/2012), and Pain (6/29/12).    SURGICAL HISTORY   has a past surgical history that includes cataract extraction with iol and knee arthroplasty total (7/12/2012).    FAMILY HISTORY  Family History   Problem Relation Age of Onset    Hypertension Mother     Lung Disease Father         COPD    Diabetes Unknown     Heart Disease Unknown        SOCIAL HISTORY  Social History     Tobacco Use    Smoking status: Former     Packs/day: 1.00     Years:  "15.00     Pack years: 15.00     Types: Cigarettes     Quit date: 1971     Years since quittin.2    Smokeless tobacco: Never    Tobacco comments:     quit 40yrs ago   Substance and Sexual Activity    Alcohol use: Yes     Alcohol/week: 10.5 oz     Types: 21 Standard drinks or equivalent per week    Drug use: No    Sexual activity: Not on file       CURRENT MEDICATIONS  Home Medications       Reviewed by Eliza Díaz R.N. (Registered Nurse) on 23 at 1201  Med List Status: Partial     Medication Last Dose Status   aspirin EC (ECOTRIN) 81 MG Tablet Delayed Response  Active   atorvastatin (LIPITOR) 40 MG TABS  Active   calcitRIOL (ROCALTROL) 0.25 MCG CAPS  Active   Cholecalciferol (VITAMIN D3) 1000 UNIT TABS  Active   DILTIAZem CD (TIAZAC) 240 MG SR capsule  Active   Docusate Sodium (STOOL SOFTENER LAXATIVE PO)  Active   ezetimibe (ZETIA) 10 MG TABS  Active   folic acid (FOLVITE) 1 MG TABS  Active   furosemide (LASIX) 40 MG Tab  Active   insulin aspart (NOVOLOG) 100 UNIT/ML Solution  Active   lisinopril (PRINIVIL) 10 MG Tab  Active   NOVOLOG FLEXPEN 100 UNIT/ML solution for injection  Active   omeprazole (PRILOSEC) 20 MG CPDR  Active   sitagliptin (JANUVIA) 50 MG TABS  Active   TRESIBA FLEXTOUCH 200 UNIT/ML Solution Pen-injector  Active                    ALLERGIES  No Known Allergies    PHYSICAL EXAM  VITAL SIGNS: BP 99/54   Pulse 89   Temp 36.4 °C (97.6 °F) (Temporal)   Resp 20   Ht 1.67 m (5' 5.75\")   Wt 98 kg (216 lb 0.8 oz)   SpO2 88%   BMI 35.14 kg/m²    Physical Exam  Vitals and nursing note reviewed.   Constitutional:       Appearance: Normal appearance.   HENT:      Head: Normocephalic and atraumatic.      Right Ear: External ear normal.      Left Ear: External ear normal.      Nose: Nose normal.      Mouth/Throat:      Mouth: Mucous membranes are moist.   Eyes:      Extraocular Movements: Extraocular movements intact.      Conjunctiva/sclera: Conjunctivae normal.      Pupils: Pupils " are equal, round, and reactive to light.   Neck:      Comments: Approximately 5 cm area of erythema, induration, and purulence over the posterior neck with associated tenderness.  Cardiovascular:      Rate and Rhythm: Normal rate and regular rhythm.   Pulmonary:      Effort: Pulmonary effort is normal.      Comments: Nasal cannula in place.  Genitourinary:     Comments: Small area of erythema and tenderness over the left medial buttock.  No perineal erythema.  Musculoskeletal:      Cervical back: Normal range of motion and neck supple.   Skin:     General: Skin is warm and dry.      Comments: See neck   Neurological:      General: No focal deficit present.      Mental Status: He is alert and oriented to person, place, and time.   Psychiatric:         Mood and Affect: Mood normal.         Behavior: Behavior normal.     DIAGNOSTIC STUDIES / PROCEDURES  LABS  Results for orders placed or performed during the hospital encounter of 04/06/23   CBC WITH DIFFERENTIAL   Result Value Ref Range    WBC 16.4 (H) 4.8 - 10.8 K/uL    RBC 3.78 (L) 4.70 - 6.10 M/uL    Hemoglobin 11.4 (L) 14.0 - 18.0 g/dL    Hematocrit 35.7 (L) 42.0 - 52.0 %    MCV 94.4 81.4 - 97.8 fL    MCH 30.2 27.0 - 33.0 pg    MCHC 31.9 (L) 33.7 - 35.3 g/dL    RDW 54.9 (H) 35.9 - 50.0 fL    Platelet Count 178 164 - 446 K/uL    MPV 10.5 9.0 - 12.9 fL    Neutrophils-Polys 86.00 (H) 44.00 - 72.00 %    Lymphocytes 8.10 (L) 22.00 - 41.00 %    Monocytes 4.20 0.00 - 13.40 %    Eosinophils 0.70 0.00 - 6.90 %    Basophils 0.20 0.00 - 1.80 %    Immature Granulocytes 0.80 0.00 - 0.90 %    Nucleated RBC 0.00 /100 WBC    Neutrophils (Absolute) 14.09 (H) 1.82 - 7.42 K/uL    Lymphs (Absolute) 1.33 1.00 - 4.80 K/uL    Monos (Absolute) 0.69 0.00 - 0.85 K/uL    Eos (Absolute) 0.12 0.00 - 0.51 K/uL    Baso (Absolute) 0.03 0.00 - 0.12 K/uL    Immature Granulocytes (abs) 0.13 (H) 0.00 - 0.11 K/uL    NRBC (Absolute) 0.00 K/uL     RADIOLOGY  I have independently interpreted the  diagnostic imaging associated with this visit and am waiting the final reading from the radiologist.   My preliminary interpretation is as follows: Soft tissue swelling over the posterior left neck  Radiologist interpretation:   CT-SOFT TISSUE NECK WITH   Final Result      1.  Subcutaneous abscess in the posterior LEFT mid neck measuring 2.8 cm in greatest dimension, with gas bubbles present as well as overlying skin thickening.   2.  No cervical adenopathy.   3.  Heterogeneous enlarged LEFT thyroid lobe.   4.  RIGHT pleural effusion.        INCISION & DRAINAGE  Verbal consent was obtained.  Patient was placed in the appropriate position.  The area overlying the mass was cleansed with Betadine.  Approximately 3 cc of 2% lidocaine with epinephrine were instilled into the center of the lesion.  An incision was made with an 11 blade scalpel and copious amounts of purulence was obtained.  A second incision was made just medial to the first incision and additional thick, tenacious, malodorous purulent material was expressed.  Using Kellys, loculations were broken up and additional purulence was expressed.  Approximately 10 cc of purulence was expressed in total.  The abscess cavity was irrigated and then packed with iodoform gauze.  The patient tolerated the procedure exceedingly well and there were no immediate complications.    I did attempt to drain the area of induration and erythema along the left buttock but did not get any purulent discharge.  Patient tolerated the procedure well and there were no immediate complications.  The area was bandaged.    COURSE & MEDICAL DECISION MAKING    ED Observation Status? Yes; I am placing the patient in to an observation status due to a diagnostic uncertainty as well as therapeutic intensity. Patient placed in observation status at 12:30 PM, 4/6/2023.     Observation plan is as follows: Labs, imaging, medication    Upon Reevaluation, the patient's condition has: Improved; and  will be discharged.    Patient discharged from ED Observation status at 4:10 PM (Time) 4/6/2023 (Date).     INITIAL ASSESSMENT, COURSE AND PLAN  Care Narrative: This is an 82-year-old male with a history of ESRD on Tuesday, Thursday, Saturday dialysis as well as diabetes who is here with what appears to be an abscess with surrounding cellulitis on the back of his neck that has been ongoing for the past 2 weeks.  He is afebrile with normal vital signs.  Appears well-hydrated and nontoxic.  He is compliant with his dialysis, last one was today and he received a full run.  He has no systemic symptoms to suggest sepsis.  He denies any fevers at home.  There is an area of swelling, tenderness, erythema, fluctuance over the back left side of the neck.  There does appear to be some purulent discharge just under the skin.  Given his history of diabetes I did feel it was appropriate to image the area prior to drainage.    Labs were obtained and metabolic panel is consistent with history of ESRD.  Potassium is well within normal limits.  Glucose is elevated at 218.  He does have a leukocytosis to 16.4.    CT was obtained which revealed a subcutaneous abscess in the posterior left mid neck measuring 2.8 cm with gas bubbles and overlying skin thickening.  Also noted was a right pleural effusion and heterogeneous enlarged left thyroid lobe.    Abscess was drained as above.  The patient tolerated the procedure exceedingly well.  He received a dose of Unasyn in the emergency department but did not want to wait 3 hours for vancomycin.  Wound culture was sent.  Given his diabetes and leukocytosis I did recommend hospitalization but the patient would prefer a trial of outpatient oral antibiotics which I do feel is reasonable given that he appears very well, is not febrile, has normal vital signs, and appears to be a very reliable patient.  He did not want to wait for vancomycin infusion but did receive Unasyn.  I spoke with our  on-call pharmacist and together we decided that doxycycline and Keflex would be the best antibiotic management.    I spoke with our on-call nephrologist, Dr. Najjar, to ensure that he did not feel that the patient would require dialysis tomorrow due to potential contrast-induced nephropathy.  He felt that the patient was safe to receive his dialysis as previously scheduled on Saturday.    Patient was reevaluated at bedside.  His caregiver is at bedside as well and I went over very strict return precautions as well as instructions on slow removal of the packing from the abscess cavity on his neck.  Both the patient and his caregiver are comfortable with plan.  They have reassured me that he will return if he develops new or worsening symptoms or if the area continues to worsen despite oral antibiotics.    Ultimately discharged in good and stable condition with strict return precautions.    ADDITIONAL PROBLEM LIST  Hyperglycemia  ESRD on dialysis    DISPOSITION AND DISCUSSIONS  I have discussed management of the patient with the following physicians and KENDALL's: Dr. Najjar, nephrology.    Discussion of management with other [facility] or appropriate source(s): Pharmacy -assisted with antibiotic choice      Escalation of care considered, and ultimately not performed:after discussion with the patient / family, they have elected to decline an escalation in care    Barriers to care at this time, including but not limited to:  None .     Decision tools and prescription drugs considered including, but not limited to: Antibiotics -Keflex and doxycycline .    FINAL DIAGNOSIS  1. Abscess    2. Cellulitis of neck    3. Enlarged thyroid      Electronically signed by: Tamir Paz M.D., 4/6/2023 12:30 PM

## 2023-04-06 NOTE — ED NOTES
Pt given d/c paperwork and RX p/u information; pt verbalized understanding all information given. Pt assisted out of ER in home w/c by caregiver

## 2023-04-12 NOTE — ED NOTES
"ED Positive Culture Follow-up/Notification Note:    Date: 4/11/2023     Patient seen in the ED on 4/6/2023 for neck abscess. Patient has a history of diabetes and ESRD on HD. This abscess is recurrent, but the patient would typically express drainage from the wound himself. 5 cm area of erythema, purulence, and induration on the back of his neck. Afebrile, but white count is elevated. CT confirms abscess and I&D was performed with 10 cc drained. Patient was given a dose of doxycycline and Unasyn in the ED and discharged on 7 days of Keflex and doxycycline.  1. Abscess    2. Cellulitis of neck    3. Enlarged thyroid       Discharge Medication List as of 4/6/2023  4:19 PM        START taking these medications    Details   doxycycline (MONODOX) 100 MG capsule Take 1 Capsule by mouth 2 times a day for 7 days., Disp-14 Capsule, R-0, Normal      cephALEXin (KEFLEX) 250 MG Cap Take 1 Capsule by mouth 2 times a day for 7 days., Disp-14 Capsule, R-0, Normal             Allergies: Patient has no known allergies.     Vitals:    04/06/23 1154 04/06/23 1156 04/06/23 1158 04/06/23 1628   BP:   99/54 108/54   Pulse:   89 82   Resp:   20 19   Temp:   36.4 °C (97.6 °F)    TempSrc:   Temporal    SpO2:   88% 93%   Weight:  98 kg (216 lb 0.8 oz)     Height: 1.67 m (5' 5.75\")          Final cultures:   Results       Procedure Component Value Units Date/Time    CULTURE WOUND W/ GRAM STAIN [[id number]]  (Abnormal)  (Susceptibility) Collected: 04/06/23 1528    Order Status: Completed Specimen: Wound from Abscess Updated: 04/10/23 1135     Significant Indicator POS     Source WND     Site ABSCESS     Culture Result -     Gram Stain Result Moderate WBCs.  Rare Gram positive cocci.       Culture Result Actinomyces species  Moderate growth  Actinomyces hominis  Organism identified by MALDI-TOF research use only library.        Staphylococcus aureus  Rare growth  This isolate is presumed to be clindamycin resistant based on  detection of " inducible resistance.        Anaerobic cocci  Moderate growth  Peptonophilis lacrimalis  Organism identified by MALDI-TOF research use only library.      Susceptibility       Staphylococcus aureus (2)       Antibiotic Interpretation Microscan   Method Status    Ampicillin  [*]  Resistant 8 mcg/mL JON Final    Amoxicillin/CA  [*]  Sensitive <=4/2 mcg/mL JON Final    Azithromycin Resistant >4 mcg/mL JON Final    Ceftriaxone  [*]  Sensitive <=4 mcg/mL JON Final    Clindamycin Resistant <=0.25 mcg/mL JON Final    Cephalothin  [*]  Sensitive <=8 mcg/mL JON Final    Cefoxitin Screen  [*]  Negative <=4 mcg/mL JON Final    Cefazolin Sensitive <=8 mcg/mL JON Final    Ciprofloxacin  [*]  Resistant >2 mcg/mL JON Final    Cefepime Sensitive <=4 mcg/mL JON Final    Ceftaroline Sensitive <=0.5 mcg/mL JON Final    Daptomycin Sensitive <=0.5 mcg/mL JON Final    Erythromycin Resistant >4 mcg/mL JON Final    Ampicillin/sulbactam Sensitive <=8/4 mcg/mL JON Final    Gentamicin  [*]  Sensitive <=4 mcg/mL JON Final    Vancomycin Sensitive 1 mcg/mL JON Final    Inducible Clindamycin Test  [*]  Positive >4/0.5 mcg/mL JON Final    Imipenem  [*]  Sensitive <=4 mcg/mL JON Final    Levofloxacin  [*]  Resistant >4 mcg/mL JON Final    Linezolid  [*]  Sensitive 2 mcg/mL JON Final    Meropenem  [*]  Sensitive <=2 mcg/mL JON Final    Oxacillin Sensitive 0.5 mcg/mL JON Final    Pip/Tazobactam Sensitive <=8 mcg/mL JON Final    Rifampin  [*]  Sensitive <=1 mcg/mL OJN Final    Synercid  [*]  Sensitive <=0.5 mcg/mL JON Final    Trimeth/Sulfa Sensitive <=0.5/9.5 mcg/mL JON Final    Tetracycline Sensitive <=4 mcg/mL JON Final    Tigecycline  [*]  Sensitive <=0.25 mcg/mL JON Final               [*]  Suppressed Antibiotic                   GRAM STAIN [[id number]] Collected: 04/06/23 1528    Order Status: Completed Specimen: Wound Updated: 04/06/23 9314     Significant Indicator .     Source WND     Site ABSCESS     Gram Stain Result Moderate WBCs.  Rare Gram  positive cocci.              Plan:   Wound culture grew MSSA, Actinomyces, and Peptoniphilus. Doxycycline will cover his MSSA well, but Keflex and doxycycline will not cover his Actinomyces nor Peptoniphilus. I called the patient to discuss the result, stop the Keflex, and prescribe renally dose-adjusted amoxicillin 500 mg PO daily. The patient did state his neck wound is healing well. He would like the amoxicillin sent to [facility] on [facility].    Giancarlo Michel, KaylaD

## 2023-06-02 NOTE — ED NOTES
PO med's given per order and taken well by pt   pt and son given dischg instructions both verbally understands  Rx keflex and bactrim given  both pt and son aware to take to pharmacy and have them fill it   instructed pt to return for worsening symptoms  verbally understands  d/c'ed to home in NAD

## 2023-06-02 NOTE — ED TRIAGE NOTES
"Bib son for above complaints.     Chief Complaint   Patient presents with    Wound Check     Left leg scratch for past two weeks but has not gotten any better. Pt has hx of diabetes.      States his leg has been itchy as well.    /62   Pulse 78   Temp 36.3 °C (97.3 °F) (Temporal)   Resp 20   Ht 1.727 m (5' 8\")   Wt 95.7 kg (211 lb)   SpO2 96%   BMI 32.08 kg/m²     "

## 2023-06-02 NOTE — ED PROVIDER NOTES
ED Provider Note    CHIEF COMPLAINT  Chief Complaint   Patient presents with    Wound Check     Left leg scratch for past two weeks but has not gotten any better. Pt has hx of diabetes.         HPI    Primary care provider: Dorian De M.D.   History obtained from: Patient and son  History limited by: None     Kameron Elias is a 83 y.o. male who presents to the ED with son complaining of open wound on the anterior portion of left lower leg for about 10 days.  Patient reports that he often has itchiness on his legs and scratches himself causing the open wound.  It has been present for 10 days and patient is concerned because it is not healing and he has history of diabetes.  Patient states he has been cleaning the wound with hydrogen peroxide.  He denies any other new symptoms.  He does have chronic shortness of breath and is on 4-5 L of oxygen 24 hours a day.  He denies fever/chills/nausea/vomiting.  Son reports that patient also goes to dialysis.    REVIEW OF SYSTEMS  Please see HPI for pertinent positives/negatives.     PAST MEDICAL HISTORY  Past Medical History:   Diagnosis Date    Neuropathic pain 2013    Ataxia 2013    Pain 12    b/l knees    Benign hypertensive heart disease without heart failure 2012    Fatigue 2012    HTN (hypertension), malignant 2012    Obesity 2012    Acid reflux     Arthritis     Diabetes     Hyperlipidemia     Hypertension         SURGICAL HISTORY  Past Surgical History:   Procedure Laterality Date    KNEE ARTHROPLASTY TOTAL  2012    Performed by ISABELLA MARK at SURGERY [facility] ORS    CATARACT EXTRACTION WITH IOL          SOCIAL HISTORY  Social History     Tobacco Use    Smoking status: Former     Packs/day: 1.00     Years: 15.00     Pack years: 15.00     Types: Cigarettes     Quit date: 1971     Years since quittin.3    Smokeless tobacco: Never    Tobacco comments:     " quit 40yrs ago   Vaping Use    Vaping Use: Never used   Substance and Sexual Activity    Alcohol use: Not Currently     Alcohol/week: 10.5 oz     Types: 21 Standard drinks or equivalent per week    Drug use: No    Sexual activity: Not on file        FAMILY HISTORY  Family History   Problem Relation Age of Onset    Hypertension Mother     Lung Disease Father         COPD    Diabetes Unknown     Heart Disease Unknown         CURRENT MEDICATIONS  Home Medications       Reviewed by Jonathan Devi R.N. (Registered Nurse) on 06/01/23 at 1728  Med List Status: Not Addressed     Medication Last Dose Status   amoxicillin (AMOXIL) 500 MG Cap  Active   atorvastatin (LIPITOR) 40 MG Tab  Active   calcitRIOL (ROCALTROL) 0.25 MCG CAPS  Active   Cholecalciferol (VITAMIN D3) 1000 UNIT TABS  Active   dilTIAZem CD (CARDIZEM CD) 240 MG CAPSULE SR 24 HR  Active   ezetimibe (ZETIA) 10 MG TABS  Active   omeprazole (PRILOSEC) 20 MG delayed-release capsule  Active   polyethylene glycol/lytes (MIRALAX) 17 g Pack  Active   TRESIBA FLEXTOUCH 200 UNIT/ML Solution Pen-injector  Active                     ALLERGIES  No Known Allergies     PHYSICAL EXAM  VITAL SIGNS: /64   Pulse 70   Temp 36.3 °C (97.3 °F) (Temporal)   Resp 16   Ht 1.727 m (5' 8\")   Wt 95.7 kg (211 lb)   SpO2 96%   BMI 32.08 kg/m²  @KENISHA[[id number]::@     Pulse ox interpretation: 96% I interpret this pulse ox as normal     Constitutional: Well developed, well nourished, alert in no apparent distress, nontoxic appearance    HENT: No external signs of trauma, normocephalic    Eyes: No discharge, no icterus     Neck: No stridor    Cardiovascular: Strong distal pulses and good perfusion    Thorax & Lungs: No respiratory distress    Extremities: No cyanosis, mild bilateral lower extremity edema with chronic appearing skin changes likely due to venostasis, 2 open wounds on anterior portion of left lower leg with mild surrounding erythema without tenderness to palpation or " crepitus/fluctuance/streaking or active drainage, no gross deformity, intact distal pulses with brisk cap refill    Skin: Warm, dry, no pallor/cyanosis, no rash noted except as above  Neuro: A/O times 3, no focal deficits noted    Psychiatric: Cooperative      DIAGNOSTIC STUDIES / PROCEDURES        LABS  All labs reviewed by me.     Results for orders placed or performed during the hospital encounter of 04/06/23   CBC WITH DIFFERENTIAL   Result Value Ref Range    WBC 16.4 (H) 4.8 - 10.8 K/uL    RBC 3.78 (L) 4.70 - 6.10 M/uL    Hemoglobin 11.4 (L) 14.0 - 18.0 g/dL    Hematocrit 35.7 (L) 42.0 - 52.0 %    MCV 94.4 81.4 - 97.8 fL    MCH 30.2 27.0 - 33.0 pg    MCHC 31.9 (L) 33.7 - 35.3 g/dL    RDW 54.9 (H) 35.9 - 50.0 fL    Platelet Count 178 164 - 446 K/uL    MPV 10.5 9.0 - 12.9 fL    Neutrophils-Polys 86.00 (H) 44.00 - 72.00 %    Lymphocytes 8.10 (L) 22.00 - 41.00 %    Monocytes 4.20 0.00 - 13.40 %    Eosinophils 0.70 0.00 - 6.90 %    Basophils 0.20 0.00 - 1.80 %    Immature Granulocytes 0.80 0.00 - 0.90 %    Nucleated RBC 0.00 /100 WBC    Neutrophils (Absolute) 14.09 (H) 1.82 - 7.42 K/uL    Lymphs (Absolute) 1.33 1.00 - 4.80 K/uL    Monos (Absolute) 0.69 0.00 - 0.85 K/uL    Eos (Absolute) 0.12 0.00 - 0.51 K/uL    Baso (Absolute) 0.03 0.00 - 0.12 K/uL    Immature Granulocytes (abs) 0.13 (H) 0.00 - 0.11 K/uL    NRBC (Absolute) 0.00 K/uL   Basic Metabolic Panel   Result Value Ref Range    Sodium 132 (L) 135 - 145 mmol/L    Potassium 4.4 3.6 - 5.5 mmol/L    Chloride 98 96 - 112 mmol/L    Co2 23 20 - 33 mmol/L    Glucose 218 (H) 65 - 99 mg/dL    Bun 27 (H) 8 - 22 mg/dL    Creatinine 3.34 (H) 0.50 - 1.40 mg/dL    Calcium 8.8 8.4 - 10.2 mg/dL    Anion Gap 11.0 7.0 - 16.0   ESTIMATED GFR   Result Value Ref Range    GFR (CKD-EPI) 18 (A) >60 mL/min/1.73 m 2   CULTURE WOUND W/ GRAM STAIN    Specimen: Abscess; Wound   Result Value Ref Range    Significant Indicator POS (POS)     Source WND     Site ABSCESS     Culture Result -  (A)     Gram Stain Result Moderate WBCs.  Rare Gram positive cocci.       Culture Result (A)      Actinomyces species  Moderate growth  Actinomyces hominis  Organism identified by MALDI-TOF research use only library.      Culture Result (A)      Staphylococcus aureus  Rare growth  This isolate is presumed to be clindamycin resistant based on  detection of inducible resistance.      Culture Result (A)      Anaerobic cocci  Moderate growth  Peptonophilis lacrimalis  Organism identified by MALDI-TOF research use only library.         Susceptibility    Staphylococcus aureus - JON     Azithromycin  Resistant mcg/mL     Clindamycin  Resistant mcg/mL     Cefazolin  Sensitive mcg/mL     Cefepime  Sensitive mcg/mL     Ceftaroline  Sensitive mcg/mL     Daptomycin  Sensitive mcg/mL     Erythromycin  Resistant mcg/mL     Ampicillin/sulbactam  Sensitive mcg/mL     Vancomycin  Sensitive mcg/mL     Oxacillin  Sensitive mcg/mL     Pip/Tazobactam  Sensitive mcg/mL     Trimeth/Sulfa  Sensitive mcg/mL     Tetracycline  Sensitive mcg/mL   GRAM STAIN    Specimen: Wound   Result Value Ref Range    Significant Indicator .     Source WND     Site ABSCESS     Gram Stain Result Moderate WBCs.  Rare Gram positive cocci.           RADIOLOGY  I have independently interpreted the diagnostic imaging associated with this visit and am waiting the final reading from the radiologist.     No orders to display          COURSE & MEDICAL DECISION MAKING  Nursing notes, VS, PMSFHx reviewed in chart.     Review of past medical records shows the patient was last seen in this ED April 6, 2023 for abscess to the back of the neck and was prescribed Keflex and doxycycline.  Patient was admitted at [facility] on October 15, 2022 for acute respiratory failure.      Differential diagnoses considered include but are not limited to: Nonhealing wound, cellulitis, venous stasis dermatitis      ED Observation Status? No; Patient does not meet criteria for ED  Observation.       Discussion of management with other \[facility]\"" or appropriate source(s): None     Escalation of care considered, and ultimately not performed: blood analysis, diagnostic imaging, and acute inpatient care management, however at this time, the patient is most appropriate for outpatient management.       Decision tools and prescription drugs considered including, but not limited to: Antibiotics   and Pain Medications   .      History and physical exam as above.  This is a 83-year-old male patient with multiple past medical history including chronic renal failure on dialysis, diabetes, hypertension hyperlipidemia and oxygen dependency who presents with nonhealing superficial open wounds on anterior portion of left lower leg for about 10 days.  He appears to have developing surrounding cellulitis and will be started on Bactrim and Keflex given his risk factors.  Patient otherwise in no acute distress and nontoxic in appearance.  At this time, I have low clinical suspicion for sepsis, necrotizing fasciitis, drainable abscess, septic joint.  He was advised on good wound care, outpatient follow-up and return to ED precautions.  Patient and son verbalized understanding and agreed with plan of care with no further questions or concerns.      The patient is referred to a primary physician for blood pressure management, diabetic screening, and for all other preventative health concerns.       FINAL IMPRESSION  1. Open wound of left lower extremity, initial encounter Acute   2. Cellulitis of left anterior lower leg Acute          DISPOSITION  Patient will be discharged home in stable condition.       FOLLOW UP  Dorian De M.D.  [address] [place] [address]  [place]iseo LEE [id number] [phone]    Call in 1 day      [facility], Emergency Dept  [address] [address] [facility] [id number] [phone]    If symptoms worsen         OUTPATIENT MEDICATIONS  Discharge Medication List as of 6/1/2023  6:07 PM         START taking these medications    Details   sulfamethoxazole-trimethoprim (BACTRIM DS) 800-160 MG tablet Take 1 Tablet by mouth 2 times a day for 5 days., Disp-10 Tablet, R-0, Print Rx Paper      cephALEXin (KEFLEX) 500 MG Cap Take 1 Capsule by mouth 4 times a day for 5 days., Disp-20 Capsule, R-0, Print Rx Paper                Electronically signed by: Nav Cristobal D.O., 6/1/2023 5:40 PM      Portions of this record were made with voice recognition software.  Despite my review, spelling/grammar/context errors may still remain.  Interpretation of this chart should be taken in this context.

## 2023-06-13 NOTE — ED TRIAGE NOTES
Chief Complaint   Patient presents with    Wound Check    Pt complains of left leg wound, increasing in pain, pt states  it is not healing. Pt diabetic and is on dialysis. Pt currently not on ABX.

## 2023-06-13 NOTE — ED PROVIDER NOTES
ED Provider Note    CHIEF COMPLAINT  Chief Complaint   Patient presents with    Wound Check       EXTERNAL RECORDS REVIEWED  Inpatient Notes [facility] admitted from the fourth to the seventh due to pleural effusion and pneumonia    HPI/ROS  LIMITATION TO HISTORY   Select: : None  OUTSIDE HISTORIAN(S):  None    Kameron Elias is a 83 y.o. male who presents to the ED with an open wound of the left leg.  The patient states he has been having an open wound to his left leg for the last several weeks.  The patient was seen here in the beginning of the month and was prescribed antibiotics.  He states that it was healing however today at dialysis he had sharp severe pain of the left tib-fib area.  He states that it was a sharp, spasm, lasted for several hours.  Patient states that the pain is gone now, he is asking for antibiotics.  Of note the patient was just recently admitted to the hospital approximately week ago and admitted for pneumonia and was prescribed doxycycline.  Patient denies any shortness of breath, abdominal pains, fevers.    PAST MEDICAL HISTORY   has a past medical history of Acid reflux, Arthritis, Ataxia (2013), Benign hypertensive heart disease without heart failure (2012), Diabetes, Fatigue (2012), HTN (hypertension), malignant (2012), Hyperlipidemia, Hypertension, Neuropathic pain (2013), Obesity (2012), and Pain (12).    SURGICAL HISTORY   has a past surgical history that includes cataract extraction with iol and knee arthroplasty total (2012).    FAMILY HISTORY  Family History   Problem Relation Age of Onset    Hypertension Mother     Lung Disease Father         COPD    Diabetes Unknown     Heart Disease Unknown        SOCIAL HISTORY  Social History     Tobacco Use    Smoking status: Former     Packs/day: 1.00     Years: 15.00     Pack years: 15.00     Types: Cigarettes     Quit date: 1971     Years since quittin.4    Smokeless tobacco: Never     "Tobacco comments:     quit 40yrs ago   Vaping Use    Vaping Use: Never used   Substance and Sexual Activity    Alcohol use: Not Currently     Alcohol/week: 10.5 oz     Types: 21 Standard drinks or equivalent per week    Drug use: No    Sexual activity: Not on file       CURRENT MEDICATIONS  Home Medications    **Home medications have not yet been reviewed for this encounter**         ALLERGIES  No Known Allergies    PHYSICAL EXAM  VITAL SIGNS: /57   Pulse 88   Temp 37.1 °C (98.8 °F) (Temporal)   Resp 16   Ht 1.727 m (5' 8\")   Wt 90 kg (198 lb 6.6 oz)   SpO2 98%   BMI 30.17 kg/m²    Well-developed well-nourished 83-year-old male who appears in mild distress  Atraumatic, normocephalic, oropharynx is clear  Chest no respiratory distress  Cardiovascular good pulses  Left leg with 2 small open wounds, minimal surrounding erythema, no drainage, no induration, no abscess    Neuro awake alert speech is clear    COURSE & MEDICAL DECISION MAKING    INITIAL ASSESSMENT, COURSE AND PLAN  Care Narrative: Patient has open wound on his left leg, he was having pain, I believe the patient was likely having a muscle spasm.  I will put the patient on Flexeril, the patient is asking for antibiotics, I believe this is reasonable.  Discussed with the pharmacist we will put the patient on Augmentin 500 mg daily.  The patient will be discharged home, the patient return with increasing redness pain swelling or any other concerns.  Of note I originally ordered laboratory test however the patient is a very difficult draw, they were able to get a blood culture, I do not see any benefit to getting additional blood test at this point time.      DISPOSITION AND DISCUSSIONS  Discussion of management with other [facility] or appropriate source(s): Pharmacy renal dosing antibiotic      Escalation of care considered, and ultimately not performed:blood analysis  FINAL DIAGNOSIS  1. Open wound    2. Pain of left lower extremity       "     Electronically signed by: Blu Rouse M.D., 6/13/2023 2:58 PM

## 2023-06-15 NOTE — ED NOTES
"Pt states \"i'm only here because I was next door at dialysis and the nurses said I need my bandage changed.\" Pt has wounds to left anterior shin, x3 bandaids present and are CDI. Pt states he is was seen here this week for the wounds and is currently on course of abx. Pt states he is not concerned with the wounds or bandages \"I'm just listening to what the nurses told me, I really do want to be here for this, I would like to go home now if the bandaids are good.\"  Pt decided to leave without being seen by a provider.   "

## 2023-12-16 NOTE — TELEPHONE ENCOUNTER
MRI NURSING NOTES:    No response rec from MR Serafin supervisor re other non-cardiac implant conditionality.  Sent another secure e mail (flagged as high priority) for fu.

## 2023-12-18 PROBLEM — T81.30XA WOUND DEHISCENCE: Status: ACTIVE | Noted: 2023-01-01

## 2023-12-18 PROBLEM — S68.119A AMPUTATION FINGER: Status: ACTIVE | Noted: 2023-01-01

## 2023-12-20 NOTE — TELEPHONE ENCOUNTER
"MRI NURSING NOTES: CON'T FROM 12.19.23 @ 1850 PHONE CALL TO SHERINE, SPOUSE:  Clarified c MRI dasha, Mingo Salas, & Malachi that [facility] is the only one to do non-conditional MRI. Called and updated Sherine to clarify c ordering provider if MRI still needed bec procedure scheduled on 12.29.23 already.  Pt states MRI is scheduled for 12.29.23.  Also, [facility] won't be able to do Non-conditional MR due to pacer/lead system.  Pt states he \"would just like and say [he] does not have a pacemaker.\" Advised not to do this as he could die.    Sherine verbalized understanding of all of above.     @ 1911, pt called back: pt called back and insists MRI is scheduled for 12.29.2023 and he will show up for this bec \"my appointment is what it is and we'll see what happens.\" Pt continues to state \"I regret ever saying I have a pacemaker.\" Reinforced again \"NOT to lie about not having a pacemaker because [he/pt] could die.\" Pt verbalized understanding once again and he will \"show up for his MRI appointment on Dec 29 at [facility].\"  "

## 2024-01-01 ENCOUNTER — ANESTHESIA EVENT (OUTPATIENT)
Dept: SURGERY | Facility: MEDICAL CENTER | Age: 84
DRG: 474 | End: 2024-01-01
Payer: MEDICARE

## 2024-01-01 ENCOUNTER — PRE-ADMISSION TESTING (OUTPATIENT)
Dept: ADMISSIONS | Facility: MEDICAL CENTER | Age: 84
DRG: 474 | End: 2024-01-01
Attending: STUDENT IN AN ORGANIZED HEALTH CARE EDUCATION/TRAINING PROGRAM
Payer: MEDICARE

## 2024-01-01 ENCOUNTER — ANESTHESIA (OUTPATIENT)
Dept: SURGERY | Facility: MEDICAL CENTER | Age: 84
DRG: 474 | End: 2024-01-01
Payer: MEDICARE

## 2024-01-01 ENCOUNTER — HOSPITAL ENCOUNTER (INPATIENT)
Facility: MEDICAL CENTER | Age: 84
LOS: 2 days | DRG: 474 | End: 2024-01-07
Attending: STUDENT IN AN ORGANIZED HEALTH CARE EDUCATION/TRAINING PROGRAM | Admitting: STUDENT IN AN ORGANIZED HEALTH CARE EDUCATION/TRAINING PROGRAM
Payer: MEDICARE

## 2024-01-01 VITALS
SYSTOLIC BLOOD PRESSURE: 136 MMHG | RESPIRATION RATE: 16 BRPM | BODY MASS INDEX: 31.91 KG/M2 | TEMPERATURE: 97.5 F | DIASTOLIC BLOOD PRESSURE: 42 MMHG | OXYGEN SATURATION: 100 % | HEIGHT: 68 IN | WEIGHT: 210.54 LBS | HEART RATE: 86 BPM

## 2024-01-01 VITALS — HEIGHT: 68 IN | BODY MASS INDEX: 30.41 KG/M2

## 2024-01-01 DIAGNOSIS — S68.119A AMPUTATION OF FINGER, INITIAL ENCOUNTER: ICD-10-CM

## 2024-01-01 DIAGNOSIS — M86.9 FINGER OSTEOMYELITIS (HCC): ICD-10-CM

## 2024-01-01 LAB
ALBUMIN SERPL BCP-MCNC: 2.2 G/DL (ref 3.2–4.9)
ALBUMIN/GLOB SERPL: 0.9 G/DL
ALP SERPL-CCNC: 162 U/L (ref 30–99)
ALT SERPL-CCNC: 38 U/L (ref 2–50)
ANION GAP SERPL CALC-SCNC: 12 MMOL/L (ref 7–16)
ANION GAP SERPL CALC-SCNC: 13 MMOL/L (ref 7–16)
ANION GAP SERPL CALC-SCNC: 20 MMOL/L (ref 7–16)
AST SERPL-CCNC: 33 U/L (ref 12–45)
BACTERIA BLD CULT: NORMAL
BACTERIA BLD CULT: NORMAL
BACTERIA SPEC ANAEROBE CULT: NORMAL
BACTERIA TISS AEROBE CULT: ABNORMAL
BILIRUB SERPL-MCNC: 0.3 MG/DL (ref 0.1–1.5)
BUN SERPL-MCNC: 39 MG/DL (ref 8–22)
BUN SERPL-MCNC: 52 MG/DL (ref 8–22)
BUN SERPL-MCNC: 60 MG/DL (ref 8–22)
CALCIUM ALBUM COR SERPL-MCNC: 10.5 MG/DL (ref 8.5–10.5)
CALCIUM SERPL-MCNC: 8.7 MG/DL (ref 8.4–10.2)
CALCIUM SERPL-MCNC: 9.1 MG/DL (ref 8.4–10.2)
CALCIUM SERPL-MCNC: 9.2 MG/DL (ref 8.4–10.2)
CHLORIDE SERPL-SCNC: 100 MMOL/L (ref 96–112)
CHLORIDE SERPL-SCNC: 97 MMOL/L (ref 96–112)
CHLORIDE SERPL-SCNC: 98 MMOL/L (ref 96–112)
CO2 SERPL-SCNC: 17 MMOL/L (ref 20–33)
CO2 SERPL-SCNC: 24 MMOL/L (ref 20–33)
CO2 SERPL-SCNC: 25 MMOL/L (ref 20–33)
CREAT SERPL-MCNC: 3.26 MG/DL (ref 0.5–1.4)
CREAT SERPL-MCNC: 3.49 MG/DL (ref 0.5–1.4)
CREAT SERPL-MCNC: 4.2 MG/DL (ref 0.5–1.4)
EKG IMPRESSION: NORMAL
ERYTHROCYTE [DISTWIDTH] IN BLOOD BY AUTOMATED COUNT: 53.5 FL (ref 35.9–50)
ERYTHROCYTE [DISTWIDTH] IN BLOOD BY AUTOMATED COUNT: 54.3 FL (ref 35.9–50)
ERYTHROCYTE [DISTWIDTH] IN BLOOD BY AUTOMATED COUNT: 56.6 FL (ref 35.9–50)
FUNGUS SPEC CULT: NORMAL
FUNGUS SPEC FUNGUS STN: NORMAL
FUNGUS SPEC FUNGUS STN: NORMAL
GFR SERPLBLD CREATININE-BSD FMLA CKD-EPI: 13 ML/MIN/1.73 M 2
GFR SERPLBLD CREATININE-BSD FMLA CKD-EPI: 17 ML/MIN/1.73 M 2
GFR SERPLBLD CREATININE-BSD FMLA CKD-EPI: 18 ML/MIN/1.73 M 2
GLOBULIN SER CALC-MCNC: 2.4 G/DL (ref 1.9–3.5)
GLUCOSE BLD STRIP.AUTO-MCNC: 112 MG/DL (ref 65–99)
GLUCOSE BLD STRIP.AUTO-MCNC: 123 MG/DL (ref 65–99)
GLUCOSE BLD STRIP.AUTO-MCNC: 128 MG/DL (ref 65–99)
GLUCOSE BLD STRIP.AUTO-MCNC: 147 MG/DL (ref 65–99)
GLUCOSE BLD STRIP.AUTO-MCNC: 153 MG/DL (ref 65–99)
GLUCOSE BLD STRIP.AUTO-MCNC: 159 MG/DL (ref 65–99)
GLUCOSE BLD STRIP.AUTO-MCNC: 169 MG/DL (ref 65–99)
GLUCOSE BLD STRIP.AUTO-MCNC: 179 MG/DL (ref 65–99)
GLUCOSE BLD STRIP.AUTO-MCNC: 227 MG/DL (ref 65–99)
GLUCOSE SERPL-MCNC: 129 MG/DL (ref 65–99)
GLUCOSE SERPL-MCNC: 159 MG/DL (ref 65–99)
GLUCOSE SERPL-MCNC: 167 MG/DL (ref 65–99)
GRAM STN SPEC: ABNORMAL
GRAM STN SPEC: NORMAL
HAV IGM SERPL QL IA: NORMAL
HBV CORE IGM SER QL: NORMAL
HBV SURFACE AB SERPL IA-ACNC: 136 MIU/ML (ref 0–10)
HBV SURFACE AG SER QL: NORMAL
HCT VFR BLD AUTO: 32.6 % (ref 42–52)
HCT VFR BLD AUTO: 35.8 % (ref 42–52)
HCT VFR BLD AUTO: 38.6 % (ref 42–52)
HCV AB SER QL: NORMAL
HGB BLD-MCNC: 10.4 G/DL (ref 14–18)
HGB BLD-MCNC: 11.1 G/DL (ref 14–18)
HGB BLD-MCNC: 12.1 G/DL (ref 14–18)
MAGNESIUM SERPL-MCNC: 1.9 MG/DL (ref 1.5–2.5)
MCH RBC QN AUTO: 30.1 PG (ref 27–33)
MCH RBC QN AUTO: 30.4 PG (ref 27–33)
MCH RBC QN AUTO: 30.9 PG (ref 27–33)
MCHC RBC AUTO-ENTMCNC: 31 G/DL (ref 32.3–36.5)
MCHC RBC AUTO-ENTMCNC: 31.3 G/DL (ref 32.3–36.5)
MCHC RBC AUTO-ENTMCNC: 31.9 G/DL (ref 32.3–36.5)
MCV RBC AUTO: 95.3 FL (ref 81.4–97.8)
MCV RBC AUTO: 96 FL (ref 81.4–97.8)
MCV RBC AUTO: 99.7 FL (ref 81.4–97.8)
PATHOLOGY CONSULT NOTE: NORMAL
PLATELET # BLD AUTO: 188 K/UL (ref 164–446)
PLATELET # BLD AUTO: 193 K/UL (ref 164–446)
PLATELET # BLD AUTO: 199 K/UL (ref 164–446)
PMV BLD AUTO: 10 FL (ref 9–12.9)
PMV BLD AUTO: 10.3 FL (ref 9–12.9)
PMV BLD AUTO: 10.6 FL (ref 9–12.9)
POTASSIUM SERPL-SCNC: 3.9 MMOL/L (ref 3.6–5.5)
POTASSIUM SERPL-SCNC: 4.1 MMOL/L (ref 3.6–5.5)
POTASSIUM SERPL-SCNC: 4.5 MMOL/L (ref 3.6–5.5)
PROT SERPL-MCNC: 4.6 G/DL (ref 6–8.2)
RBC # BLD AUTO: 3.42 M/UL (ref 4.7–6.1)
RBC # BLD AUTO: 3.59 M/UL (ref 4.7–6.1)
RBC # BLD AUTO: 4.02 M/UL (ref 4.7–6.1)
RHODAMINE-AURAMINE STN SPEC: NORMAL
SCCMEC + MECA PNL NOSE NAA+PROBE: POSITIVE
SIGNIFICANT IND 70042: ABNORMAL
SIGNIFICANT IND 70042: NORMAL
SITE SITE: ABNORMAL
SITE SITE: NORMAL
SODIUM SERPL-SCNC: 134 MMOL/L (ref 135–145)
SODIUM SERPL-SCNC: 136 MMOL/L (ref 135–145)
SODIUM SERPL-SCNC: 136 MMOL/L (ref 135–145)
SOURCE SOURCE: ABNORMAL
SOURCE SOURCE: NORMAL
VANCOMYCIN TIMED 2584: 14.9 UG/ML
WBC # BLD AUTO: 11.7 K/UL (ref 4.8–10.8)
WBC # BLD AUTO: 12.4 K/UL (ref 4.8–10.8)
WBC # BLD AUTO: 13.1 K/UL (ref 4.8–10.8)

## 2024-01-01 PROCEDURE — 87206 SMEAR FLUORESCENT/ACID STAI: CPT

## 2024-01-01 PROCEDURE — A9270 NON-COVERED ITEM OR SERVICE: HCPCS | Performed by: ANESTHESIOLOGY

## 2024-01-01 PROCEDURE — 80048 BASIC METABOLIC PNL TOTAL CA: CPT

## 2024-01-01 PROCEDURE — 94760 N-INVAS EAR/PLS OXIMETRY 1: CPT

## 2024-01-01 PROCEDURE — 700105 HCHG RX REV CODE 258: Performed by: STUDENT IN AN ORGANIZED HEALTH CARE EDUCATION/TRAINING PROGRAM

## 2024-01-01 PROCEDURE — 82962 GLUCOSE BLOOD TEST: CPT | Mod: 91

## 2024-01-01 PROCEDURE — 160048 HCHG OR STATISTICAL LEVEL 1-5: Performed by: STUDENT IN AN ORGANIZED HEALTH CARE EDUCATION/TRAINING PROGRAM

## 2024-01-01 PROCEDURE — 87205 SMEAR GRAM STAIN: CPT

## 2024-01-01 PROCEDURE — 87070 CULTURE OTHR SPECIMN AEROBIC: CPT

## 2024-01-01 PROCEDURE — 770006 HCHG ROOM/CARE - MED/SURG/GYN SEMI*

## 2024-01-01 PROCEDURE — 160002 HCHG RECOVERY MINUTES (STAT): Performed by: STUDENT IN AN ORGANIZED HEALTH CARE EDUCATION/TRAINING PROGRAM

## 2024-01-01 PROCEDURE — 700102 HCHG RX REV CODE 250 W/ 637 OVERRIDE(OP): Performed by: HOSPITALIST

## 2024-01-01 PROCEDURE — 80202 ASSAY OF VANCOMYCIN: CPT

## 2024-01-01 PROCEDURE — A9270 NON-COVERED ITEM OR SERVICE: HCPCS | Performed by: HOSPITALIST

## 2024-01-01 PROCEDURE — 700102 HCHG RX REV CODE 250 W/ 637 OVERRIDE(OP): Performed by: INTERNAL MEDICINE

## 2024-01-01 PROCEDURE — 85027 COMPLETE CBC AUTOMATED: CPT

## 2024-01-01 PROCEDURE — 80074 ACUTE HEPATITIS PANEL: CPT

## 2024-01-01 PROCEDURE — 700101 HCHG RX REV CODE 250: Performed by: INTERNAL MEDICINE

## 2024-01-01 PROCEDURE — 770020 HCHG ROOM/CARE - TELE (206)

## 2024-01-01 PROCEDURE — 99239 HOSP IP/OBS DSCHRG MGMT >30: CPT | Performed by: INTERNAL MEDICINE

## 2024-01-01 PROCEDURE — 93010 ELECTROCARDIOGRAM REPORT: CPT | Performed by: INTERNAL MEDICINE

## 2024-01-01 PROCEDURE — 88311 DECALCIFY TISSUE: CPT

## 2024-01-01 PROCEDURE — 82962 GLUCOSE BLOOD TEST: CPT

## 2024-01-01 PROCEDURE — 36415 COLL VENOUS BLD VENIPUNCTURE: CPT

## 2024-01-01 PROCEDURE — 700111 HCHG RX REV CODE 636 W/ 250 OVERRIDE (IP): Performed by: INTERNAL MEDICINE

## 2024-01-01 PROCEDURE — A9270 NON-COVERED ITEM OR SERVICE: HCPCS | Performed by: INTERNAL MEDICINE

## 2024-01-01 PROCEDURE — 83735 ASSAY OF MAGNESIUM: CPT

## 2024-01-01 PROCEDURE — 99233 SBSQ HOSP IP/OBS HIGH 50: CPT | Performed by: INTERNAL MEDICINE

## 2024-01-01 PROCEDURE — 700111 HCHG RX REV CODE 636 W/ 250 OVERRIDE (IP): Performed by: ANESTHESIOLOGY

## 2024-01-01 PROCEDURE — 99497 ADVNCD CARE PLAN 30 MIN: CPT | Performed by: HOSPITALIST

## 2024-01-01 PROCEDURE — 93005 ELECTROCARDIOGRAM TRACING: CPT | Performed by: STUDENT IN AN ORGANIZED HEALTH CARE EDUCATION/TRAINING PROGRAM

## 2024-01-01 PROCEDURE — 87641 MR-STAPH DNA AMP PROBE: CPT

## 2024-01-01 PROCEDURE — 700111 HCHG RX REV CODE 636 W/ 250 OVERRIDE (IP): Mod: JZ | Performed by: HOSPITALIST

## 2024-01-01 PROCEDURE — 87077 CULTURE AEROBIC IDENTIFY: CPT | Mod: 91

## 2024-01-01 PROCEDURE — 80053 COMPREHEN METABOLIC PANEL: CPT

## 2024-01-01 PROCEDURE — 700101 HCHG RX REV CODE 250: Performed by: HOSPITALIST

## 2024-01-01 PROCEDURE — 700105 HCHG RX REV CODE 258: Performed by: HOSPITALIST

## 2024-01-01 PROCEDURE — 700102 HCHG RX REV CODE 250 W/ 637 OVERRIDE(OP): Performed by: ANESTHESIOLOGY

## 2024-01-01 PROCEDURE — 160027 HCHG SURGERY MINUTES - 1ST 30 MINS LEVEL 2: Performed by: STUDENT IN AN ORGANIZED HEALTH CARE EDUCATION/TRAINING PROGRAM

## 2024-01-01 PROCEDURE — 160038 HCHG SURGERY MINUTES - EA ADDL 1 MIN LEVEL 2: Performed by: STUDENT IN AN ORGANIZED HEALTH CARE EDUCATION/TRAINING PROGRAM

## 2024-01-01 PROCEDURE — 87075 CULTR BACTERIA EXCEPT BLOOD: CPT

## 2024-01-01 PROCEDURE — 97161 PT EVAL LOW COMPLEX 20 MIN: CPT

## 2024-01-01 PROCEDURE — 160036 HCHG PACU - EA ADDL 30 MINS PHASE I: Performed by: STUDENT IN AN ORGANIZED HEALTH CARE EDUCATION/TRAINING PROGRAM

## 2024-01-01 PROCEDURE — 86706 HEP B SURFACE ANTIBODY: CPT

## 2024-01-01 PROCEDURE — 87186 SC STD MICRODIL/AGAR DIL: CPT | Mod: 91

## 2024-01-01 PROCEDURE — 90935 HEMODIALYSIS ONE EVALUATION: CPT

## 2024-01-01 PROCEDURE — 160035 HCHG PACU - 1ST 60 MINS PHASE I: Performed by: STUDENT IN AN ORGANIZED HEALTH CARE EDUCATION/TRAINING PROGRAM

## 2024-01-01 PROCEDURE — 97535 SELF CARE MNGMENT TRAINING: CPT

## 2024-01-01 PROCEDURE — 87015 SPECIMEN INFECT AGNT CONCNTJ: CPT | Mod: 91

## 2024-01-01 PROCEDURE — 87116 MYCOBACTERIA CULTURE: CPT

## 2024-01-01 PROCEDURE — 700105 HCHG RX REV CODE 258: Performed by: INTERNAL MEDICINE

## 2024-01-01 PROCEDURE — 99223 1ST HOSP IP/OBS HIGH 75: CPT | Mod: 25 | Performed by: HOSPITALIST

## 2024-01-01 PROCEDURE — 87040 BLOOD CULTURE FOR BACTERIA: CPT | Mod: 91

## 2024-01-01 PROCEDURE — 160009 HCHG ANES TIME/MIN: Performed by: STUDENT IN AN ORGANIZED HEALTH CARE EDUCATION/TRAINING PROGRAM

## 2024-01-01 PROCEDURE — 0X6J0Z6 DETACHMENT AT RIGHT HAND, COMPLETE 3RD RAY, OPEN APPROACH: ICD-10-PCS | Performed by: STUDENT IN AN ORGANIZED HEALTH CARE EDUCATION/TRAINING PROGRAM

## 2024-01-01 PROCEDURE — 88304 TISSUE EXAM BY PATHOLOGIST: CPT

## 2024-01-01 PROCEDURE — 87102 FUNGUS ISOLATION CULTURE: CPT

## 2024-01-01 RX ORDER — LINEZOLID 600 MG/1
600 TABLET, FILM COATED ORAL EVERY 12 HOURS
Qty: 20 TABLET | Refills: 0 | Status: ACTIVE | OUTPATIENT
Start: 2024-01-01 | End: 2024-01-01

## 2024-01-01 RX ORDER — HYDROMORPHONE HYDROCHLORIDE 1 MG/ML
0.25 INJECTION, SOLUTION INTRAMUSCULAR; INTRAVENOUS; SUBCUTANEOUS
Status: DISCONTINUED | OUTPATIENT
Start: 2024-01-01 | End: 2024-01-01 | Stop reason: HOSPADM

## 2024-01-01 RX ORDER — HEPARIN SODIUM 5000 [USP'U]/ML
5000 INJECTION, SOLUTION INTRAVENOUS; SUBCUTANEOUS EVERY 8 HOURS
Status: DISCONTINUED | OUTPATIENT
Start: 2024-01-01 | End: 2024-01-01 | Stop reason: HOSPADM

## 2024-01-01 RX ORDER — ACETAMINOPHEN 500 MG
1000 TABLET ORAL ONCE
Status: COMPLETED | OUTPATIENT
Start: 2024-01-01 | End: 2024-01-01

## 2024-01-01 RX ORDER — HYDROMORPHONE HYDROCHLORIDE 1 MG/ML
0.2 INJECTION, SOLUTION INTRAMUSCULAR; INTRAVENOUS; SUBCUTANEOUS
Status: DISCONTINUED | OUTPATIENT
Start: 2024-01-01 | End: 2024-01-01 | Stop reason: HOSPADM

## 2024-01-01 RX ORDER — MIDODRINE HYDROCHLORIDE 10 MG/1
10 TABLET ORAL ONCE
Status: DISCONTINUED | OUTPATIENT
Start: 2024-01-01 | End: 2024-01-01

## 2024-01-01 RX ORDER — MIDODRINE HYDROCHLORIDE 5 MG/1
5 TABLET ORAL
Status: DISCONTINUED | OUTPATIENT
Start: 2024-01-01 | End: 2024-01-01 | Stop reason: HOSPADM

## 2024-01-01 RX ORDER — DOXYCYCLINE 100 MG/1
100 TABLET ORAL EVERY 12 HOURS
Status: DISCONTINUED | OUTPATIENT
Start: 2024-01-01 | End: 2024-01-01

## 2024-01-01 RX ORDER — HYDROMORPHONE HYDROCHLORIDE 1 MG/ML
0.1 INJECTION, SOLUTION INTRAMUSCULAR; INTRAVENOUS; SUBCUTANEOUS
Status: DISCONTINUED | OUTPATIENT
Start: 2024-01-01 | End: 2024-01-01 | Stop reason: HOSPADM

## 2024-01-01 RX ORDER — ONDANSETRON 2 MG/ML
INJECTION INTRAMUSCULAR; INTRAVENOUS PRN
Status: DISCONTINUED | OUTPATIENT
Start: 2024-01-01 | End: 2024-01-01 | Stop reason: SURG

## 2024-01-01 RX ORDER — POLYETHYLENE GLYCOL 3350 17 G/17G
1 POWDER, FOR SOLUTION ORAL
Status: DISCONTINUED | OUTPATIENT
Start: 2024-01-01 | End: 2024-01-01 | Stop reason: HOSPADM

## 2024-01-01 RX ORDER — LINEZOLID 600 MG/1
600 TABLET, FILM COATED ORAL EVERY 12 HOURS
Status: DISCONTINUED | OUTPATIENT
Start: 2024-01-01 | End: 2024-01-01 | Stop reason: HOSPADM

## 2024-01-01 RX ORDER — AMOXICILLIN 250 MG
2 CAPSULE ORAL 2 TIMES DAILY
Status: DISCONTINUED | OUTPATIENT
Start: 2024-01-01 | End: 2024-01-01 | Stop reason: HOSPADM

## 2024-01-01 RX ORDER — OXYCODONE HYDROCHLORIDE 5 MG/1
5 TABLET ORAL
Status: DISCONTINUED | OUTPATIENT
Start: 2024-01-01 | End: 2024-01-01 | Stop reason: HOSPADM

## 2024-01-01 RX ORDER — DIPHENHYDRAMINE HYDROCHLORIDE 50 MG/ML
12.5 INJECTION INTRAMUSCULAR; INTRAVENOUS
Status: DISCONTINUED | OUTPATIENT
Start: 2024-01-01 | End: 2024-01-01 | Stop reason: HOSPADM

## 2024-01-01 RX ORDER — OXYCODONE HCL 5 MG/5 ML
10 SOLUTION, ORAL ORAL
Status: DISCONTINUED | OUTPATIENT
Start: 2024-01-01 | End: 2024-01-01 | Stop reason: HOSPADM

## 2024-01-01 RX ORDER — COVID-19 ANTIGEN TEST
1 KIT MISCELLANEOUS 2 TIMES DAILY PRN
COMMUNITY

## 2024-01-01 RX ORDER — OXYCODONE HCL 5 MG/5 ML
5 SOLUTION, ORAL ORAL
Status: DISCONTINUED | OUTPATIENT
Start: 2024-01-01 | End: 2024-01-01 | Stop reason: HOSPADM

## 2024-01-01 RX ORDER — CEFAZOLIN SODIUM 1 G/3ML
INJECTION, POWDER, FOR SOLUTION INTRAMUSCULAR; INTRAVENOUS PRN
Status: DISCONTINUED | OUTPATIENT
Start: 2024-01-01 | End: 2024-01-01 | Stop reason: SURG

## 2024-01-01 RX ORDER — HYDROMORPHONE HYDROCHLORIDE 1 MG/ML
0.4 INJECTION, SOLUTION INTRAMUSCULAR; INTRAVENOUS; SUBCUTANEOUS
Status: DISCONTINUED | OUTPATIENT
Start: 2024-01-01 | End: 2024-01-01 | Stop reason: HOSPADM

## 2024-01-01 RX ORDER — HEPARIN SODIUM 1000 [USP'U]/ML
1500 INJECTION, SOLUTION INTRAVENOUS; SUBCUTANEOUS ONCE
Status: COMPLETED | OUTPATIENT
Start: 2024-01-01 | End: 2024-01-01

## 2024-01-01 RX ORDER — ATORVASTATIN CALCIUM 40 MG/1
40 TABLET, FILM COATED ORAL EVERY EVENING
Status: DISCONTINUED | OUTPATIENT
Start: 2024-01-01 | End: 2024-01-01 | Stop reason: HOSPADM

## 2024-01-01 RX ORDER — SODIUM CHLORIDE, SODIUM LACTATE, POTASSIUM CHLORIDE, CALCIUM CHLORIDE 600; 310; 30; 20 MG/100ML; MG/100ML; MG/100ML; MG/100ML
INJECTION, SOLUTION INTRAVENOUS CONTINUOUS
Status: DISCONTINUED | OUTPATIENT
Start: 2024-01-01 | End: 2024-01-01 | Stop reason: HOSPADM

## 2024-01-01 RX ORDER — OXYCODONE HYDROCHLORIDE 5 MG/1
2.5 TABLET ORAL
Status: DISCONTINUED | OUTPATIENT
Start: 2024-01-01 | End: 2024-01-01 | Stop reason: HOSPADM

## 2024-01-01 RX ORDER — MIDODRINE HYDROCHLORIDE 5 MG/1
5 TABLET ORAL
Status: DISCONTINUED | OUTPATIENT
Start: 2024-01-01 | End: 2024-01-01

## 2024-01-01 RX ORDER — ONDANSETRON 2 MG/ML
4 INJECTION INTRAMUSCULAR; INTRAVENOUS
Status: DISCONTINUED | OUTPATIENT
Start: 2024-01-01 | End: 2024-01-01 | Stop reason: HOSPADM

## 2024-01-01 RX ORDER — IPRATROPIUM BROMIDE AND ALBUTEROL SULFATE 2.5; .5 MG/3ML; MG/3ML
3 SOLUTION RESPIRATORY (INHALATION)
Status: DISCONTINUED | OUTPATIENT
Start: 2024-01-01 | End: 2024-01-01 | Stop reason: HOSPADM

## 2024-01-01 RX ORDER — DEXTROSE MONOHYDRATE 25 G/50ML
25 INJECTION, SOLUTION INTRAVENOUS
Status: DISCONTINUED | OUTPATIENT
Start: 2024-01-01 | End: 2024-01-01 | Stop reason: HOSPADM

## 2024-01-01 RX ORDER — CALCITRIOL 0.25 UG/1
0.25 CAPSULE, LIQUID FILLED ORAL DAILY
Status: DISCONTINUED | OUTPATIENT
Start: 2024-01-01 | End: 2024-01-01

## 2024-01-01 RX ORDER — DEXTROSE MONOHYDRATE 25 G/50ML
25 INJECTION, SOLUTION INTRAVENOUS
Status: DISCONTINUED | OUTPATIENT
Start: 2024-01-01 | End: 2024-01-01

## 2024-01-01 RX ORDER — OMEPRAZOLE 20 MG/1
20 CAPSULE, DELAYED RELEASE ORAL DAILY
Status: DISCONTINUED | OUTPATIENT
Start: 2024-01-01 | End: 2024-01-01 | Stop reason: HOSPADM

## 2024-01-01 RX ORDER — MIDODRINE HYDROCHLORIDE 5 MG/1
10 TABLET ORAL ONCE
Status: COMPLETED | OUTPATIENT
Start: 2024-01-01 | End: 2024-01-01

## 2024-01-01 RX ORDER — PHENYLEPHRINE HCL IN 0.9% NACL 0.5 MG/5ML
SYRINGE (ML) INTRAVENOUS PRN
Status: DISCONTINUED | OUTPATIENT
Start: 2024-01-01 | End: 2024-01-01 | Stop reason: SURG

## 2024-01-01 RX ORDER — ACETAMINOPHEN 325 MG/1
650 TABLET ORAL EVERY 6 HOURS PRN
Status: DISCONTINUED | OUTPATIENT
Start: 2024-01-01 | End: 2024-01-01 | Stop reason: HOSPADM

## 2024-01-01 RX ORDER — BISACODYL 10 MG
10 SUPPOSITORY, RECTAL RECTAL
Status: DISCONTINUED | OUTPATIENT
Start: 2024-01-01 | End: 2024-01-01 | Stop reason: HOSPADM

## 2024-01-01 RX ORDER — LINEZOLID 600 MG/1
600 TABLET, FILM COATED ORAL EVERY 12 HOURS
Qty: 14 TABLET | Refills: 0 | Status: SHIPPED | OUTPATIENT
Start: 2024-01-01 | End: 2024-01-01

## 2024-01-01 RX ORDER — EZETIMIBE 10 MG/1
10 TABLET ORAL DAILY
Status: DISCONTINUED | OUTPATIENT
Start: 2024-01-01 | End: 2024-01-01 | Stop reason: HOSPADM

## 2024-01-01 RX ORDER — SODIUM CHLORIDE 9 MG/ML
INJECTION, SOLUTION INTRAVENOUS ONCE
Status: COMPLETED | OUTPATIENT
Start: 2024-01-01 | End: 2024-01-01

## 2024-01-01 RX ADMIN — PROPOFOL 150 MG: 10 INJECTION, EMULSION INTRAVENOUS at 12:04

## 2024-01-01 RX ADMIN — MIDODRINE HYDROCHLORIDE 5 MG: 5 TABLET ORAL at 11:40

## 2024-01-01 RX ADMIN — LINEZOLID 600 MG: 600 TABLET, FILM COATED ORAL at 11:40

## 2024-01-01 RX ADMIN — OMEPRAZOLE 20 MG: 20 CAPSULE, DELAYED RELEASE ORAL at 18:22

## 2024-01-01 RX ADMIN — ONDANSETRON 4 MG: 2 INJECTION INTRAMUSCULAR; INTRAVENOUS at 12:51

## 2024-01-01 RX ADMIN — POLYETHYLENE GLYCOL 3350 1 PACKET: 17 POWDER, FOR SOLUTION ORAL at 18:09

## 2024-01-01 RX ADMIN — HEPARIN SODIUM 1500 UNITS: 1000 INJECTION, SOLUTION INTRAVENOUS; SUBCUTANEOUS at 14:00

## 2024-01-01 RX ADMIN — EZETIMIBE 10 MG: 10 TABLET ORAL at 05:56

## 2024-01-01 RX ADMIN — EZETIMIBE 10 MG: 10 TABLET ORAL at 06:00

## 2024-01-01 RX ADMIN — INSULIN HUMAN 2 UNITS: 100 INJECTION, SOLUTION PARENTERAL at 11:38

## 2024-01-01 RX ADMIN — CALCITRIOL 0.25 MCG: 0.25 CAPSULE ORAL at 05:56

## 2024-01-01 RX ADMIN — VANCOMYCIN HYDROCHLORIDE 1750 MG: 5 INJECTION, POWDER, LYOPHILIZED, FOR SOLUTION INTRAVENOUS at 08:05

## 2024-01-01 RX ADMIN — AMPICILLIN AND SULBACTAM 3 G: 1; 2 INJECTION, POWDER, FOR SOLUTION INTRAMUSCULAR; INTRAVENOUS at 18:27

## 2024-01-01 RX ADMIN — FENTANYL CITRATE 50 MCG: 50 INJECTION, SOLUTION INTRAMUSCULAR; INTRAVENOUS at 12:21

## 2024-01-01 RX ADMIN — INSULIN HUMAN 1 UNITS: 100 INJECTION, SOLUTION PARENTERAL at 20:55

## 2024-01-01 RX ADMIN — Medication 200 MCG: at 12:10

## 2024-01-01 RX ADMIN — OMEPRAZOLE 20 MG: 20 CAPSULE, DELAYED RELEASE ORAL at 05:56

## 2024-01-01 RX ADMIN — ACETAMINOPHEN 650 MG: 325 TABLET ORAL at 05:37

## 2024-01-01 RX ADMIN — INSULIN HUMAN 1 UNITS: 100 INJECTION, SOLUTION PARENTERAL at 20:23

## 2024-01-01 RX ADMIN — FENTANYL CITRATE 50 MCG: 50 INJECTION, SOLUTION INTRAMUSCULAR; INTRAVENOUS at 11:59

## 2024-01-01 RX ADMIN — VANCOMYCIN HYDROCHLORIDE 2250 MG: 5 INJECTION, POWDER, LYOPHILIZED, FOR SOLUTION INTRAVENOUS at 20:15

## 2024-01-01 RX ADMIN — CEFAZOLIN 2 G: 1 INJECTION, POWDER, FOR SOLUTION INTRAMUSCULAR; INTRAVENOUS at 12:25

## 2024-01-01 RX ADMIN — CALCITRIOL 0.25 MCG: 0.25 CAPSULE ORAL at 18:22

## 2024-01-01 RX ADMIN — INSULIN HUMAN 1 UNITS: 100 INJECTION, SOLUTION PARENTERAL at 06:00

## 2024-01-01 RX ADMIN — INSULIN HUMAN 1 UNITS: 100 INJECTION, SOLUTION PARENTERAL at 11:41

## 2024-01-01 RX ADMIN — SODIUM CHLORIDE: 9 INJECTION, SOLUTION INTRAVENOUS at 11:06

## 2024-01-01 RX ADMIN — MIDODRINE HYDROCHLORIDE 5 MG: 5 TABLET ORAL at 08:04

## 2024-01-01 RX ADMIN — OMEPRAZOLE 20 MG: 20 CAPSULE, DELAYED RELEASE ORAL at 06:00

## 2024-01-01 RX ADMIN — MIDODRINE HYDROCHLORIDE 10 MG: 5 TABLET ORAL at 18:22

## 2024-01-01 RX ADMIN — ATORVASTATIN CALCIUM 40 MG: 40 TABLET, FILM COATED ORAL at 18:22

## 2024-01-01 RX ADMIN — ACETAMINOPHEN 1000 MG: 500 TABLET ORAL at 13:50

## 2024-01-01 RX ADMIN — ACETAMINOPHEN 650 MG: 325 TABLET ORAL at 20:11

## 2024-01-01 RX ADMIN — MIDODRINE HYDROCHLORIDE 5 MG: 5 TABLET ORAL at 17:05

## 2024-01-01 RX ADMIN — EZETIMIBE 10 MG: 10 TABLET ORAL at 18:22

## 2024-01-01 RX ADMIN — ATORVASTATIN CALCIUM 40 MG: 40 TABLET, FILM COATED ORAL at 18:09

## 2024-01-01 RX ADMIN — AMPICILLIN AND SULBACTAM 3 G: 1; 2 INJECTION, POWDER, FOR SOLUTION INTRAMUSCULAR; INTRAVENOUS at 18:15

## 2024-01-01 ASSESSMENT — FIBROSIS 4 INDEX
FIB4 SCORE: 1.87
FIB4 SCORE: 1.63
FIB4 SCORE: 2.21
FIB4 SCORE: 2.27

## 2024-01-01 ASSESSMENT — COGNITIVE AND FUNCTIONAL STATUS - GENERAL
TOILETING: A LITTLE
DRESSING REGULAR LOWER BODY CLOTHING: A LOT
SUGGESTED CMS G CODE MODIFIER MOBILITY: CL
WALKING IN HOSPITAL ROOM: A LOT
DRESSING REGULAR UPPER BODY CLOTHING: A LITTLE
CLIMB 3 TO 5 STEPS WITH RAILING: A LOT
DAILY ACTIVITIY SCORE: 19
MOVING FROM LYING ON BACK TO SITTING ON SIDE OF FLAT BED: A LOT
STANDING UP FROM CHAIR USING ARMS: A LOT
TURNING FROM BACK TO SIDE WHILE IN FLAT BAD: A LITTLE
MOVING TO AND FROM BED TO CHAIR: A LITTLE
HELP NEEDED FOR BATHING: A LITTLE
MOBILITY SCORE: 14
SUGGESTED CMS G CODE MODIFIER DAILY ACTIVITY: CK

## 2024-01-01 ASSESSMENT — ENCOUNTER SYMPTOMS
FOCAL WEAKNESS: 0
BRUISES/BLEEDS EASILY: 0
EYE REDNESS: 0
MUSCULOSKELETAL NEGATIVE: 1
GASTROINTESTINAL NEGATIVE: 1
EYES NEGATIVE: 1
ABDOMINAL PAIN: 0
STRIDOR: 0
FLANK PAIN: 0
NEUROLOGICAL NEGATIVE: 1
VOMITING: 0
PSYCHIATRIC NEGATIVE: 1
CHILLS: 0
NERVOUS/ANXIOUS: 0
FEVER: 0
EYE DISCHARGE: 0
CARDIOVASCULAR NEGATIVE: 1
COUGH: 0
MYALGIAS: 0
SHORTNESS OF BREATH: 0
RESPIRATORY NEGATIVE: 1

## 2024-01-01 ASSESSMENT — PAIN DESCRIPTION - PAIN TYPE
TYPE: SURGICAL PAIN
TYPE: CHRONIC PAIN
TYPE: ACUTE PAIN;SURGICAL PAIN
TYPE: SURGICAL PAIN
TYPE: ACUTE PAIN
TYPE: SURGICAL PAIN

## 2024-01-01 ASSESSMENT — LIFESTYLE VARIABLES
ALCOHOL_USE: NO
TOTAL SCORE: 0
AVERAGE NUMBER OF DAYS PER WEEK YOU HAVE A DRINK CONTAINING ALCOHOL: 0
ON A TYPICAL DAY WHEN YOU DRINK ALCOHOL HOW MANY DRINKS DO YOU HAVE: 0
HAVE PEOPLE ANNOYED YOU BY CRITICIZING YOUR DRINKING: NO
EVER HAD A DRINK FIRST THING IN THE MORNING TO STEADY YOUR NERVES TO GET RID OF A HANGOVER: NO
CONSUMPTION TOTAL: NEGATIVE
HAVE YOU EVER FELT YOU SHOULD CUT DOWN ON YOUR DRINKING: NO
TOTAL SCORE: 0
TOTAL SCORE: 0
HOW MANY TIMES IN THE PAST YEAR HAVE YOU HAD 5 OR MORE DRINKS IN A DAY: 0
EVER FELT BAD OR GUILTY ABOUT YOUR DRINKING: NO

## 2024-01-01 ASSESSMENT — PATIENT HEALTH QUESTIONNAIRE - PHQ9
SUM OF ALL RESPONSES TO PHQ9 QUESTIONS 1 AND 2: 0
2. FEELING DOWN, DEPRESSED, IRRITABLE, OR HOPELESS: NOT AT ALL
1. LITTLE INTEREST OR PLEASURE IN DOING THINGS: NOT AT ALL

## 2024-01-01 ASSESSMENT — PAIN SCALES - GENERAL: PAIN_LEVEL: 0

## 2024-01-03 PROBLEM — M86.9 FINGER OSTEOMYELITIS (HCC): Status: ACTIVE | Noted: 2024-01-01

## 2024-01-05 PROBLEM — E86.1 HYPOTENSION DUE TO HYPOVOLEMIA: Status: ACTIVE | Noted: 2024-01-01

## 2024-01-05 PROBLEM — I50.32 CHRONIC DIASTOLIC HEART FAILURE (HCC): Status: ACTIVE | Noted: 2024-01-01

## 2024-01-05 PROBLEM — I95.89 HYPOTENSION DUE TO HYPOVOLEMIA: Status: ACTIVE | Noted: 2024-01-01

## 2024-01-05 PROBLEM — Z71.89 ACP (ADVANCE CARE PLANNING): Status: ACTIVE | Noted: 2024-01-01

## 2024-01-05 NOTE — ANESTHESIA POSTPROCEDURE EVALUATION
Patient: Kameron Elias    Procedure Summary       Date: 01/05/24 Room / Location:  OR  / SURGERY [facility]    Anesthesia Start: 1156 Anesthesia Stop: 1310    Procedures:       RIGHT MIDDLE FINGER REVISION AMPUTATION FOR INFECTION. RIGHT HAND IRRIGATION AND DEBRIDEMENT AND POSSIBLE WOUND VAC PLACEMENT. (Right: Finger)      IRRIGATION AND DEBRIDEMENT, WOUND,RIGHT MIDDLE FINGER (Right: Finger)      APPLICATION OR REPLACEMENT, WOUND VAC (Right: Finger) Diagnosis:       Finger osteomyelitis (HCC)      (Finger osteomyelitis (HCC) [M86.9])    Surgeons: Travis Weir M.D. Responsible Provider: Oren Linares M.D.    Anesthesia Type: general ASA Status: 3            Final Anesthesia Type: general  Last vitals  BP   Blood Pressure : 129/66    Temp   36.2 °C (97.2 °F)    Pulse   96   Resp   18    SpO2   95 %      Anesthesia Post Evaluation    Patient location during evaluation: PACU  Patient participation: complete - patient participated  Level of consciousness: awake and alert  Pain score: 0    Airway patency: patent  Anesthetic complications: no  Cardiovascular status: hemodynamically stable  Respiratory status: acceptable  Hydration status: euvolemic    PONV: none          No notable events documented.     Nurse Pain Score: 9 (NPRS)

## 2024-01-05 NOTE — H&P
Surgery Orthopedic History & Physical Note    Date  1/5/2024    Primary Care Physician  Dorian De M.D.      Pre-Op Diagnosis Codes:     * Finger osteomyelitis (HCC) [M86.9]    HPI  Patient ID:  Kameron Elias is a 83 y.o. male.     Chief Complaint:  Pain and Follow-Up of the Right Hand     Last Surgery: No surgery found on No surgery found     HPI  Pain Assessment  Pain Assessment: 0-10  Pain Score: 0 - No pain  Pain Location: Hand  Pain Orientation: Right  Pain Descriptors: Throbbing  Pain Frequency: Intermittent     Patient is here for repeat wound check after right middle finger amputation for osteomyelitis.  Has been going to wound care for his wound dehiscence and nonhealing wound.  Recent CT scan showed changes consistent with osteomyelitis of the distal aspect of the third metacarpal.  He denies any fever or chills today.              Review of Systems           Objective   General: Well nourished, well developed, age appropriate appearance   HEENT: Normocephalic, atraumatic  Psych: Normal mood and affect  Neck: Supple, no pain to motion  Chest/Pulmonary: breathing unlabored, no audible wheezing  Ortho: Right hand: Obvious wound dehiscence with drainage noted from the previous amputation site.     Last Imaging Result(s):   CT-HAND W/O PLUS RECONS RIGHT  Narrative: 12/29/2023 4:46 PM     HISTORY/REASON FOR EXAM:  CT Right hand to [facility] for osteomyelitis (patient has a pacemaker), pre surgical planning; send to [facility].     TECHNIQUE/ EXAM DESCRIPTION AND NUMBER OF VIEWS:  CT scan of the RIGHT hand without contrast, with reconstructions.     Thin-section noncontrast helical images were obtained from the distal radius/ulna through the proximal metacarpals. Coronal and sagittal reconstructions were generated from the axial images.     Up to date radiation dose reduction adjustments have been utilized to meet ALARA standards for radiation dose reduction.     COMPARISON:  None.     FINDINGS:  There is erosive  change in the third metacarpal head with an overlying soft tissue and and foci of air. There is soft tissue thickening around the third metatarsal head.     There are small osteophytes in the interphalangeal joints. There is narrowing of the fourth MCP joint without erosive change     The visualized tendons are grossly intact. There is focal thickening of the third flexor tendon at the level of the proximal metacarpal     No abscess is identified  Impression: 1.  Erosive change in the distal third metacarpal head is suggestive of osteomyelitis given overlying soft tissue wound and soft tissue thickening     2.  No drainable abscess is identified     3.  Nodular thickening of the flexor tendon at the level of the proximal metacarpal              Assessment & Plan   Encounter Diagnoses:         Orders Placed This Encounter    Surgical Case Request: AMPUTATION, FINGER    POCT COVID Antigen         No follow-ups on file.    Assessment/Plan:  Pre-Op Diagnosis Codes:     * Finger osteomyelitis (HCC) [M86.9]  Procedure(s):  RIGHT MIDDLE FINGER REVISION AMPUTATION FOR INFECTION. RIGHT HAND IRRIGATION AND DEBRIDEMENT AND POSSIBLE WOUND VAC PLACEMENT.  IRRIGATION AND DEBRIDEMENT, WOUND,RIGHT MIDDLE FINGER  APPLICATION OR REPLACEMENT, WOUND VAC    Amputation of right middle finger     We discussed his wound is now infected.  This will require revision surgery for partial amputation of the third metacarpal wound debridement and possible wound vacuum.  He will likely require admission for ID consultation.

## 2024-01-05 NOTE — OR NURSING
1307- Patient arrived from OR via gurney.  R hand dressing CDI; Cap refill in the R hand <3 seconds    Sedation/Resp Status: eye opening to verbal.  Respirations spontaneous and non-labored.    HR 70 partially paced; VSS on 10L 02 via simple mask.  No pain or nausea reported     1320- No pain or nausea reported. The dressing is CDI    1335- Called the patients caregiver and gave updates.    1350-  at bedside assessing the patient. Medicated per MAR with tylenol.    1405- The patient reports tolerable pain and no nausea. The dressing is CDI.    1420- Left a voicemail for the patients wife.    1431- Gave report to [place]id TEIXEIRA    1435- No change in the patient condition. The dressing is CDI. Awaiting transport.

## 2024-01-05 NOTE — OP REPORT
DATE OF OPERATION: 1/5/2024     PREOPERATIVE DIAGNOSIS: Right third metacarpal osteomyelitis    POSTOPERATIVE DIAGNOSIS: Same    PROCEDURE PERFORMED: Right third ray amputation     SURGEON: Travis Weir M.D.     ASSISTANT: None    ANESTHESIA: General    SPECIMEN: None    ESTIMATED BLOOD LOSS: 30 mL    IMPLANTS: None      INDICATIONS: The patient is a 83 y.o. male who presented with multiple medical comorbidities including end-stage renal disease, type 2 diabetes, congestive heart failure status post right finger amputation for osteomyelitis.  He subsequently went on to wound dehiscence and infection.  Recent CT scan showed evidence of osteomyelitis of the third metacarpal.  I discussed the risks and benefits of the procedure which include but are not limited to risks of infection, wound healing complication, neurovascular injury, pain, malunion, non-union, malrotation, and the medical risks of anesthesia including MI, stroke, and death.  Alternatives to surgery were also discussed, including non-operative management, which I did not recommend.  The patient was in agreement with the plan to proceed, and the informed consent was signed and documented.  I met with the patient pre-operatively and marked the operative extremity with their agreement.  We proceeded to the operating room.     DESCRIPTION OF PROCEDURE:  Patient was seen in the preoperative holding area on the day of surgery. The operative site was marked with my initials.  he was taken to the operating room and placed supine on the operative table.  Anesthesia was induced.  The operative extremity was prepped and draped in the normal sterile fashion.  Operative pause was conducted and the correct patient, site, side, procedure, and surgeon's initials on extremity were identified.  Performed standard incision over the metacarpal.  The previous dehisced wound was noted to be draining purulent fluid.  There is a small pocket of purulent fluid in the  dorsal aspect of the wound more towards the fourth metacarpal.  We then thoroughly irrigated sterile saline.  This was excised and carried out down the palmar aspect of the hand.  This point time soft tissue was excised with a rongeur and sent for cultures.  We then using a Bovie to release the soft tissue around the third metacarpal shaft.  Then using oscillating saw we resected the third metacarpal at the base.  This was then excised.  We removed any obviously infected necrotic tissue.  Active bleeding was then cauterized with Bovie cautery.  We then placed a #2 Ethibond suture closing the intermetacarpal spaces.  Next we closed the wound in layered fashion with 2-0 Vicryl and 3-0 nylon suture.  Sterile dressings were applied.  Patient was awoken taken to PACU stable condition.    POSTOPERATIVE PLAN: Nonweightbearing right hand weight bearing.  Plan to admit to the medicine service for ID consultation and medical management.  This was discussed with on-call consult physician.  Appreciate their assistance.  Once medically appropriate for discharge the patient will follow up in clinic in 2 weeks to check wounds and remove sutures/staples.      ____________________________________   Travis Weir M.D.   DD: 1/5/2024  1:04 PM

## 2024-01-05 NOTE — CONSULTS
Hospital Medicine Consultation    Date of Service  1/5/2024    Referring Physician  Travis Weir M.D.     Consulting Physician  Casey Smart M.D.    Reason for Consultation  Help manage patient multiple medical problems during his perioperative.     History of Presenting Illness  83 y.o. male with a past medical history of primary hypertension, diabetes, end-stage renal disease, chronic heart failure, chronic obstructive pulmonary disease with chronic hypoxemic respiratory failure on 5 L of oxygen who has osteomyelitis of his right middle finger who presented 1/5/2024 for elective amputation of the right middle finger with Dr Weir.  Hospitalist services were consulted to help manage patient multiple medical problems during his perioperative period.  I evaluated the patient in the PACU unit.  He has moderate having severe pain at surgery site.  Systolic blood pressure is 70s-80s over diastolic of 40s.  He is requiring 5 L of oxygen to achieve adequate saturation which is his baseline.  He denies having fevers or chills.      Review of Systems  Review of Systems   Constitutional:  Positive for malaise/fatigue. Negative for chills and fever.   Eyes:  Negative for discharge and redness.   Respiratory:  Negative for cough, shortness of breath and stridor.    Cardiovascular:  Negative for chest pain and leg swelling.   Gastrointestinal:  Negative for abdominal pain and vomiting.   Genitourinary:  Negative for flank pain.   Musculoskeletal:  Positive for joint pain. Negative for myalgias.        Right hand pain    Skin: Negative.    Neurological:  Negative for focal weakness.   Endo/Heme/Allergies:  Does not bruise/bleed easily.   Psychiatric/Behavioral:  The patient is not nervous/anxious.      Past Medical History   has a past medical history of Acid reflux, Adverse effect of anesthesia, Anemia, Arrhythmia, Arthritis, Ataxia (12/31/2013), Benign hypertensive heart disease without heart failure (05/25/2012),  Breath shortness, Cataract, Congestive heart failure (HCC), Diabetes, Dialysis patient (HCC), Fatigue (05/25/2012), High cholesterol, HTN (hypertension), malignant (05/25/2012), Hyperlipidemia, Hypertension, Neuropathic pain (12/31/2013), Obesity (05/25/2012), Pacemaker, Pain (06/29/2012), Renal disorder, and Right hand pain (01/2024).    Surgical History   has a past surgical history that includes cataract extraction with iol; knee arthroplasty total (07/12/2012); knee arthroplasty total (Left); pacemaker revision (2021); av fistula creation (Right); pr debride skin subq tissue first 20 sq cm (Right, 1/5/2024); and finger amputation (Right, 1/5/2024).    Family History  family history includes Diabetes in his unknown relative; Heart Disease in his unknown relative; Hypertension in his mother; Lung Disease in his father.    Social History   reports that he quit smoking about 52 years ago. His smoking use included cigarettes. He started smoking about 67 years ago. He has a 15.0 pack-year smoking history. He has never used smokeless tobacco. He reports that he does not currently use alcohol. He reports that he does not use drugs.    Medications  Prior to Admission Medications   Prescriptions Last Dose Informant Patient Reported? Taking?   Cholecalciferol (VITAMIN D3) 1000 UNIT TABS 1/4/2024 Patient Yes No   Sig: Take 1,000 Units by mouth every day.   NOVOLOG FLEXPEN 100 UNIT/ML solution for injection 1/4/2024  Yes No   Sig: 3 times a day before meals. Per sliding scale, pt states does not take in the morning, his BS is usually lower   Naproxen Sodium (ALEVE) 220 MG Cap 1/4/2024  Yes No   Sig: Take 1 Capsule by mouth 2 times a day as needed (pain).   TRESIBA FLEXTOUCH 200 UNIT/ML Solution Pen-injector 1/4/2024 Patient Yes No   Sig: Inject 8-10 Units under the skin see administration instructions. Takes 10 units at dinner time   Takes 8-10 units before bed   amoxicillin (AMOXIL) 500 MG Cap   No No   Sig: Take 1 Capsule  by mouth every day.   Patient not taking: Reported on 1/4/2024   atorvastatin (LIPITOR) 40 MG Tab 1/4/2024 Patient Yes No   Sig: Take 40 mg by mouth every day.   calcitRIOL (ROCALTROL) 0.25 MCG CAPS 1/4/2024 Patient Yes No   Sig: Take 0.25 mcg by mouth every day.   cyclobenzaprine (FLEXERIL) 5 mg tablet   No No   Sig: Take 1-2 Tablets by mouth 3 times a day as needed for Mild Pain or Moderate Pain.   Patient not taking: Reported on 1/4/2024   dilTIAZem CD (CARDIZEM CD) 240 MG CAPSULE SR 24 HR 1/4/2024 Patient Yes No   Sig: Take 240 mg by mouth every day.   Patient not taking: Reported on 1/4/2024   doxycycline (VIBRAMYCIN) 100 MG Tab   Yes No   Patient not taking: Reported on 1/4/2024   ezetimibe (ZETIA) 10 MG TABS  Patient Yes No   Sig: Take 10 mg by mouth every day.   midodrine (PROAMATINE) 5 MG Tab   Yes No   Sig: Take 2.5 mg by mouth.   Patient not taking: Reported on 1/4/2024   omeprazole (PRILOSEC) 20 MG delayed-release capsule  Patient Yes No   Sig: Take 20 mg by mouth every day.   oxyCODONE immediate-release (ROXICODONE) 5 MG Tab   Yes No   Patient not taking: Reported on 1/4/2024   polyethylene glycol/lytes (MIRALAX) 17 g Pack 1/4/2024 Patient Yes No   Sig: Take 17 g by mouth every day.      Facility-Administered Medications: None     Allergies  Allergies   Allergen Reactions    Tetanus Toxoid Unspecified and Rash     Physical Exam  Temp:  [36 °C (96.8 °F)-36.4 °C (97.5 °F)] 36.4 °C (97.5 °F)  Pulse:  [59-96] 65  Resp:  [16-18] 16  BP: ()/(30-66) 134/50  SpO2:  [95 %-100 %] 95 %    Physical Exam  Constitutional:       General: He is not in acute distress.     Appearance: He is not ill-appearing or diaphoretic.   HENT:      Head: Atraumatic.      Right Ear: External ear normal.      Left Ear: External ear normal.      Nose: No congestion or rhinorrhea.      Mouth/Throat:      Mouth: Mucous membranes are moist.   Eyes:      General: No scleral icterus.        Right eye: No discharge.         Left eye:  No discharge.      Pupils: Pupils are equal, round, and reactive to light.   Cardiovascular:      Rate and Rhythm: Normal rate and regular rhythm.   Pulmonary:      Effort: Pulmonary effort is normal.   Abdominal:      General: There is no distension.   Musculoskeletal:      Cervical back: Neck supple. No rigidity. No muscular tenderness.      Right lower leg: No edema.      Left lower leg: No edema.      Comments: Surgery site covered with clean dressing     Skin:     Coloration: Skin is not jaundiced or pale.   Neurological:      Mental Status: He is alert and oriented to person, place, and time.      Coordination: Coordination normal.   Psychiatric:         Mood and Affect: Mood normal.         Behavior: Behavior normal.       Laboratory  Recent Labs     01/05/24  1049   WBC 12.4*   RBC 4.02*   HEMOGLOBIN 12.1*   HEMATOCRIT 38.6*   MCV 96.0   MCH 30.1   MCHC 31.3*   RDW 54.3*   PLATELETCT 188   MPV 10.0     Recent Labs     01/05/24  1049   SODIUM 136   POTASSIUM 3.9   CHLORIDE 98   CO2 25   GLUCOSE 129*   BUN 52*   CREATININE 3.49*   CALCIUM 9.2     Imaging  No orders to display     Assessment/Plan  * Finger osteomyelitis (HCC)- (present on admission)  Assessment & Plan  We will collect blood cultures  Plan for amputation with Dr Weir   I will start empiric antibiotics with Unasyn and vancomycin, follow on deep cultures and sensitivities  Consider ID consult when culture results are available  Revised Cardiac Risk Index for Pre-Operative Risk score of:  4 points Class IV Risk  The patient has 15.0 % 30-day risk of death, MI, or cardiac arrest   In addition the patient has an elevated BMI of 30. At higher risk for postoperative hypoxia. I will order continuous pulse oximetry. Emphasized incentive spirometry. consider BiPAP postprocedure      BMI 30.0-30.9,adult- (present on admission)  Assessment & Plan  The patient has an elevated BMI of 30. At higher risk for postoperative hypoxia. I will order continuous  pulse oximetry. Emphasized incentive spirometry. consider BiPAP postprocedure       Chronic diastolic heart failure (HCC)- (present on admission)  Assessment & Plan  Not currently in exacerbation but is at high risk for so  Monitor volume status closely  Revised Cardiac Risk Index for Pre-Operative Risk score of:  4 points Class IV Risk  The patient has 15.0 % 30-day risk of death, MI, or cardiac arrest   In addition the patient has an elevated BMI of 30. At higher risk for postoperative hypoxia. I will order continuous pulse oximetry. Emphasized incentive spirometry. consider BiPAP postprocedure      Hypotension due to hypovolemia- (present on admission)  Assessment & Plan  Gentle hydration given his end-stage renal disease  I will start midodrine     Chronic obstructive pulmonary disease with acute exacerbation (HCC)- (present on admission)  Assessment & Plan  Oxygen as needed, Respiratory protocol, Bronchodilators, Incentive spirometry     Anemia due to chronic kidney disease- (present on admission)  Assessment & Plan  Secondary to chronic kidney disease  No evidence of gross bleeding.  Monitor hemoglobin. I will order a follow-up CBC.  Transfuse for a hemoglobin of less than or equal to 7     ESRD (end stage renal disease) ([facility])- (present on admission)  Assessment & Plan  Gets HD on STT.       DM (diabetes mellitus) ([facility])- (present on admission)  Assessment & Plan  With no significant hyperglycemia  Last glycated hemoglobin was 6.5%  I will start short acting insulin for now  I will order Accu-Checks, hypoglycemia protocol  Adjust according to blood sugars trend.     Essential hypertension- (present on admission)  Assessment & Plan  Currently has hypotension   I will start midodrine     ACP (advance care planning)- (present on admission)  Assessment & Plan  I had a prolonged discussion with the patient regarding goals of care, diagnoses, prognosis, and CODE STATUS. We discussed his prognosis and comorbidities.   The patient has advanced age and multiple comorbid conditions including end-stage renal disease, diabetes, hyperlipidemia, chronic obstructive pulmonary disease with chronic hypoxemic respiratory failure.  He is having an amputation for osteomyelitis of his finger.  At this point he wants to maintain a full code.  Wants to proceed with all invasive and noninvasive diagnostic and therapeutic interventions including intubation and mechanical ventilation if needed. I spent  16  minutes on advanced care planning in addition to the time spent managing the other medical problems.      Dyslipidemia- (present on admission)  Assessment & Plan  Cardiac diet.  Resume atorvastatin    GERD (gastroesophageal reflux disease)- (present on admission)  Assessment & Plan  Resume omeprazole      I had a discussion with the patient regarding goals of care, diagnoses, prognosis, and CODE STATUS. We discussed his prognosis and comorbidities.  The patient has advanced age and multiple comorbid conditions including end-stage renal disease, diabetes, hyperlipidemia, chronic obstructive pulmonary disease with chronic hypoxemic respiratory failure.  He is having an amputation for osteomyelitis of his finger.  At this point he wants to maintain a full code.  Wants to proceed with all invasive and noninvasive diagnostic and therapeutic interventions including intubation and mechanical ventilation if needed. I spent 17  minutes on advanced care planning in addition to the time spent managing the other medical problems.

## 2024-01-05 NOTE — PREPROCEDURE INSTRUCTIONS
Pt preadmitted via phone, instructions emailed. Questions answered.Patient instructed per pharmacy guidelines regarding taking, holding or contacting provider for instructions on regularly prescribed medications before surgery. Instructed to take the following medications the day of surgery with a sip of water per pharmacy medication guidelines- atorvastatin and omeprazole. Pt aware to hold AM insulin per anesthesia guidelines (states surgery before noon and he usually take no insulin in the AM, his blood sugars are low) METs score 1. Placed on fall risk protocol per hx and score. Pt unable to come for labs today.

## 2024-01-05 NOTE — ANESTHESIA PREPROCEDURE EVALUATION
Case: [id number] Date/Time: 01/05/24 1130    Procedures:       RIGHT MIDDLE FINGER REVISION AMPUTATION FOR INFECTION. RIGHT HAND IRRIGATION AND DEBRIDEMENT AND POSSIBLE WOUND VAC PLACEMENT.      IRRIGATION AND DEBRIDEMENT, WOUND,RIGHT MIDDLE FINGER      APPLICATION OR REPLACEMENT, WOUND VAC    Anesthesia type: General    Diagnosis: Finger osteomyelitis (HCC) [M86.9]    Pre-op diagnosis: Finger osteomyelitis (HCC) [M86.9]    Location:  OR  / SURGERY [facility]    Surgeons: Travis Weir M.D.            Relevant Problems   PULMONARY   (positive) Chronic obstructive pulmonary disease with acute exacerbation (HCC)   (positive) Dyspnea      CARDIAC   (positive) CHF (congestive heart failure) (HCC)   (positive) Cardiac pacemaker in situ   (positive) Essential hypertension   (positive) PAF (paroxysmal atrial fibrillation) (HCC)      GI   (positive) GERD (gastroesophageal reflux disease)         (positive) CKD (chronic kidney disease), stage IV (HCC)   (positive) ESRD (end stage renal disease) (HCC)      Other   (positive) Arthritis of knee, left   (positive) Finger osteomyelitis (HCC)       Physical Exam    Airway   Mallampati: II  TM distance: >3 FB  Neck ROM: full       Cardiovascular - normal exam  Rhythm: regular  Rate: normal  (-) murmur     Dental - normal exam           Pulmonary - normal exam  Breath sounds clear to auscultation     Abdominal    Neurological - normal exam         Other findings: Poor dentition              Anesthesia Plan    ASA 3   ASA physical status 3 criteria: implanted pacemaker    Plan - general       Airway plan will be ETT    (Dialysis yesterday. Pt had surgery several weeks ago at [facility], apparently had cardiac clearance but unavailable now. Notes requested and reviewed. Echo in June reviewed. )      Induction: intravenous    Postoperative Plan: Postoperative administration of opioids is intended.    Pertinent diagnostic labs and testing reviewed    Informed  Consent:    Anesthetic plan and risks discussed with patient.    Use of blood products discussed with: patient whom consented to blood products.

## 2024-01-05 NOTE — ANESTHESIA TIME REPORT
Anesthesia Start and Stop Event Times       Date Time Event    1/5/2024 1134 Ready for Procedure     1156 Anesthesia Start     1310 Anesthesia Stop          Responsible Staff  01/05/24      Name Role Begin End    Oren Linares M.D. Anesth 1156 1310          Overtime Reason:  no overtime (within assigned shift)    Comments:

## 2024-01-05 NOTE — ANESTHESIA PROCEDURE NOTES
Airway    Date/Time: 1/5/2024 12:07 PM    Performed by: Oren Linares M.D.  Authorized by: Oren Linares M.D.    Location:  OR  Urgency:  Elective  Indications for Airway Management:  Anesthesia      Spontaneous Ventilation: absent    Sedation Level:  Deep  Preoxygenated: Yes    Mask Difficulty Assessment:  1 - vent by mask  Final Airway Type:  Supraglottic airway    SGA Size:  4  Number of Attempts at Approach:  1   iGel LMA

## 2024-01-05 NOTE — ASSESSMENT & PLAN NOTE
Underwent right third ray amputation on 1/5/2023. Blood and wound cultures pending.  Continue Unasyn and vancomycin for now.  Will consult ID after culture results are available.

## 2024-01-05 NOTE — ASSESSMENT & PLAN NOTE
The patient has an elevated BMI of 30. At higher risk for postoperative hypoxia. Continuous pulse oximetry was ordered. Emphasized incentive spirometry.

## 2024-01-05 NOTE — OR NURSING
Patient allergies and NPO status verified, home medication reconciliation completed, belongings secured. Patient verbalizes understanding of pain scale, expected course of stay and plan of care. Surgical site verified with patient, IV access established sequentials RLE LLE has a wound left SCD on bed to avoid further injury to wound.

## 2024-01-05 NOTE — ASSESSMENT & PLAN NOTE
Hemoglobin A1c was 6.5 on 6/5/2023.  SSI with Accu-Cheks and hypoglycemia protocol started.  Monitor closely

## 2024-01-05 NOTE — ASSESSMENT & PLAN NOTE
"Currently stable. Echocardiogram from 6/2023 showed \"left ventricle is normal in size and systolic function with an ejection fraction of 55-60%\"  "

## 2024-01-06 NOTE — PROGRESS NOTES
Patient refuses to have ace wrap bandage wrapped around surgical site. Patient educated on the importance of the dressing but still continues to refuse.

## 2024-01-06 NOTE — PROGRESS NOTES
Hospital Medicine Daily Progress Note    Date of Service  1/6/2024    Chief Complaint  Kameron Elias is a 83 y.o. male admitted 1/5/2024 for elective amputation of right middle finger due to right third metacarpal osteomyelitis.  He has a history of chronic diastolic heart failure, ESRD (HD on TTS), type 2 diabetes, hypertension, dyslipidemia, GERD, COPD (on 5 L/min nasal cannula).     Previous wound cultures from 4/6/2023 grew actinomyces hominis, Staph aureus, Peptonophilis lactrimalis.     Hospital Course  Unasyn and vancomycin empirically started on admission. Patient underwent right third ray amputation on 1/5/2023.  Surgical wound cultures are pending.    Interval Problem Update  Afebrile and hemodynamically stable. Surgical wound cultures are pending.  Saturating 97% on 5 L/min nasal cannula. Nephrology consulted    I have discussed this patient's plan of care and discharge plan at IDT rounds today with Case Management, Nursing, Nursing leadership, and other members of the IDT team.    Consultants/Specialty  nephrology and orthopedics    Code Status  Full Code    Disposition  The patient is not medically cleared for discharge to home or a post-acute facility.  Anticipate discharge to: home with close outpatient follow-up    I have placed the appropriate orders for post-discharge needs.    Review of Systems  Review of Systems   Constitutional:  Positive for malaise/fatigue.   HENT: Negative.     Eyes: Negative.    Respiratory: Negative.     Cardiovascular: Negative.    Gastrointestinal: Negative.    Genitourinary: Negative.    Musculoskeletal: Negative.    Skin: Negative.         Surgical site is covered with dressing. Pain is stable   Neurological: Negative.    Endo/Heme/Allergies: Negative.    Psychiatric/Behavioral: Negative.          Physical Exam  Temp:  [36 °C (96.8 °F)-36.4 °C (97.5 °F)] 36.3 °C (97.4 °F)  Pulse:  [59-85] 85  Resp:  [12-18] 18  BP: ()/(30-74) 113/46  SpO2:  [94 %-100 %] 99  "%    Physical Exam  HENT:      Head: Normocephalic.      Nose: Nose normal.      Mouth/Throat:      Mouth: Mucous membranes are moist.   Eyes:      Pupils: Pupils are equal, round, and reactive to light.   Cardiovascular:      Rate and Rhythm: Normal rate.   Pulmonary:      Effort: Pulmonary effort is normal.   Abdominal:      Palpations: Abdomen is soft.   Musculoskeletal:      Cervical back: Normal range of motion.   Skin:     General: Skin is warm.   Neurological:      General: No focal deficit present.      Mental Status: He is alert.   Psychiatric:         Mood and Affect: Mood normal.         Fluids    Intake/Output Summary (Last 24 hours) at 1/6/2024 1140  Last data filed at 1/6/2024 0900  Gross per 24 hour   Intake 320 ml   Output --   Net 320 ml       Laboratory  Recent Labs     01/05/24  1049 01/06/24  0052   WBC 12.4* 13.1*   RBC 4.02* 3.59*   HEMOGLOBIN 12.1* 11.1*   HEMATOCRIT 38.6* 35.8*   MCV 96.0 99.7*   MCH 30.1 30.9   MCHC 31.3* 31.0*   RDW 54.3* 56.6*   PLATELETCT 188 199   MPV 10.0 10.6     Recent Labs     01/05/24  1049 01/06/24  0052   SODIUM 136 134*   POTASSIUM 3.9 4.5   CHLORIDE 98 97   CO2 25 17*   GLUCOSE 129* 167*   BUN 52* 60*   CREATININE 3.49* 4.20*   CALCIUM 9.2 9.1                   Imaging  No orders to display        Assessment/Plan  * Finger osteomyelitis (HCC)- (present on admission)  Assessment & Plan  Underwent right third ray amputation on 1/5/2023. Blood and wound cultures pending.  Continue Unasyn and vancomycin for now.  Will consult ID after culture results are available.    ESRD (end stage renal disease) (HCC)- (present on admission)  Assessment & Plan  ESRD on HD TTS.  Nephrology consulted.    Chronic diastolic heart failure (HCC)- (present on admission)  Assessment & Plan  Currently stable. Echocardiogram from 6/2023 showed \"left ventricle is normal in size and systolic function with an ejection fraction of 55-60%\"    Hypotension due to hypovolemia- (present on " admission)  Assessment & Plan  Continue midodrine with parameters    Chronic obstructive pulmonary disease with acute exacerbation (HCC)- (present on admission)  Assessment & Plan  Saturating 97% on 5 L/min nasal cannula which is his baseline.  Continue nebulizers.     Anemia due to chronic kidney disease- (present on admission)  Assessment & Plan  Secondary to ESRD.  Hemoglobin is stable.  Monitor    Dyslipidemia- (present on admission)  Assessment & Plan  Continue home Atorvastatin and Zetia. On cardiac diet    DM (diabetes mellitus) (HCC)- (present on admission)  Assessment & Plan  Hemoglobin A1c was 6.5 on 6/5/2023.  SSI with Accu-Cheks and hypoglycemia protocol started.  Monitor closely    Essential hypertension- (present on admission)  Assessment & Plan  Blood pressure has been low.  Midodrine was started.    GERD (gastroesophageal reflux disease)- (present on admission)  Assessment & Plan  Continue home omeprazole    ACP (advance care planning)- (present on admission)  Assessment & Plan  CODE STATUS was discussed.  Patient wishes to be full code    BMI 30.0-30.9,adult- (present on admission)  Assessment & Plan  The patient has an elevated BMI of 30. At higher risk for postoperative hypoxia. Continuous pulse oximetry was ordered. Emphasized incentive spirometry.          VTE prophylaxis: Heparin

## 2024-01-06 NOTE — PROGRESS NOTES
Telemetry summary    Rhythm: Paced  Rate: 74-92  Ectopy: rPVC, rCouplet  Measurements: 0.20/0.14/0.40    Normal Values  Rhythm: SR  HR Range:   Measurement: 0.12-0.2/0.06-0.10/0.30-0.52

## 2024-01-06 NOTE — PROGRESS NOTES
Telemetry Shift Summary    Rhythm Paced  HR Range 73  Ectopy none  Measurements -/0.10/-    Notified by MT regarding 8 bts that appeared to be VT. Notified MD. Per MD, those beats are from the pacemaker firing when patient's rate is low.     Normal Values  Rhythm SR  HR Range:   Measurements: 0.12-0.20/0.06-0.10/0.30-0.52

## 2024-01-06 NOTE — CARE PLAN
The patient is Stable - Low risk of patient condition declining or worsening    Shift Goals  Clinical Goals: Pain control  Patient Goals: Dialysis  Family Goals: Visit with patient    Progress made toward(s) clinical / shift goals:    Problem: Knowledge Deficit - Standard  Goal: Patient and family/care givers will demonstrate understanding of plan of care, disease process/condition, diagnostic tests and medications  Outcome: Progressing  Note: Patient's knowledge of plan of care, diagnostics, and medications regularly assessed. RN answers patient questions appropriately, education provided. Patient verbalizes better understanding of their condition.       Problem: Risk for Infection - COPD  Goal: Patient will remain free from signs and symptoms of infection  Outcome: Progressing  Flowsheets (Taken 1/6/2024 1309)  Standard Infection Interventions:   Assessed for signs and symptoms of infection   Implemented standard precautions   Instructed patient/family on signs and symptoms of infection   Provided education on proper hand hygiene and infection prevention measures  COPD Infection Interventions:   Reviewed importance of breathing exercises, effective cough, frequent position changes and adequate fluid intake   Recommended rinsing mouth with water and spitting, not swallowing   Recommended use of a spacer on the mouthpiece of inhaled corticosteroids       Patient is not progressing towards the following goals:

## 2024-01-06 NOTE — PROGRESS NOTES
Pharmacy Vancomycin Kinetics Note for 1/5/2024     83 y.o. male on Vancomycin day # 1     Vancomycin Indication (Trough based Dosing): Osteomyelitis (goal trough 10-15)    Provider specified end date: 01/10/24    Active Antibiotics (From admission, onward)      Ordered     Ordering Provider       Fri Jan 5, 2024  4:47 PM    01/05/24 1647  vancomycin 2250 mg/500mL NS IVPB premix  (vancomycin (VANCOCIN) IV (LD + Maintenance))  ONCE         Casey Smart M.D.       Fri Jan 5, 2024  1:26 PM    01/05/24 1326  MD Alert...Vancomycin per Pharmacy  PHARMACY TO DOSE        Question:  Indication(s) for vancomycin?  Answer:  Osteomyelitis    Casey Smart M.D.       Fri Jan 5, 2024  1:23 PM    01/05/24 1323  ampicillin/sulbactam (Unasyn) 3 g in  mL IVPB  EVERY 24 HOURS         Casey Smart M.D.            Dosing Weight: 92 kg (202 lb 13.2 oz)      Admission History: Admitted on 1/5/2024 for Osteomyelitis of hand, right, acute (HCC) [M86.141]  Finger osteomyelitis (HCC) [M86.9]  Pertinent history: R 3rd metacarpal osteomyelitis - underwent R 3rd ray amputation 1/5/24.  Path, wnd cx and MRSA nares ordered.  Per surgery plan ID consult to help guide therapy.  ESRD on iHD.    Allergies:     Tetanus toxoid     Pertinent cultures to date:     Results       Procedure Component Value Units Date/Time    MRSA By PCR (Amp) [[id number]]     Order Status: No result Specimen: Respirate from Nares     AFB Culture [[id number]] Collected: 01/05/24 1221    Order Status: No result Specimen: Tissue Updated: 01/05/24 1603    Narrative:      1 finger tissue(right hand)    Fungal Culture [[id number]] Collected: 01/05/24 1221    Order Status: No result Specimen: Tissue Updated: 01/05/24 1603    Narrative:      1 finger tissue(right hand)    Anaerobic Culture [[id number]] Collected: 01/05/24 1221    Order Status: No result Specimen: Tissue Updated: 01/05/24 1603    Narrative:      1 finger tissue(right hand)    CULTURE TISSUE W/ GRM STAIN  "[[id number]] Collected: 24 1221    Order Status: No result Specimen: Tissue Updated: 24 1603    Narrative:      1 finger tissue(right hand)    BLOOD CULTURE [[id number]]     Order Status: Sent Specimen: Blood from Peripheral     BLOOD CULTURE [[id number]]     Order Status: Sent Specimen: Blood from Peripheral             Labs:     Estimated Creatinine Clearance: 18 mL/min (A) (by C-G formula based on SCr of 3.49 mg/dL (H)).  Recent Labs     24  1049   WBC 12.4*     Recent Labs     24  1049   BUN 52*   CREATININE 3.49*     No intake or output data in the 24 hours ending 24 1656   /56   Pulse 63   Temp 36 °C (96.8 °F) (Temporal)   Resp 16   Ht 1.727 m (5' 8\")   Wt 92 kg (202 lb 13.2 oz)   SpO2 98%  Temp (24hrs), Av.1 °C (97 °F), Min:36 °C (96.8 °F), Max:36.3 °C (97.3 °F)      List concerns for Vancomycin clearance:     ESRD    Pharmacokinetics:     Trough kinetics:   No results for input(s): \"VANCOTROUGH\", \"VANCOPEAK\", \"VANCORANDOM\" in the last 72 hours.    A/P:     -  Vancomycin dose: 2250 mg IV once    -  Next vancomycin level(s):    Random vancomycin level 24 @ 0600    -  Comments: MRSA nares ordered.  Confirmed patient not on outpatient abx with dialysis.  Loading dose ordered.  Plan random level 24 and redose when level ~ < 15      Nerissa Massey, PharmD, BCPS      "

## 2024-01-06 NOTE — CARE PLAN
The patient is Stable - Low risk of patient condition declining or worsening    Shift Goals  Clinical Goals: pain management, IV ABX, monitor vitals  Patient Goals: rest and comfort  Family Goals: HARIKA    Progress made toward(s) clinical / shift goals:    Problem: Fall Risk  Goal: Patient will remain free from falls  Outcome: Progressing     Problem: Pain - Standard  Goal: Alleviation of pain or a reduction in pain to the patient’s comfort goal  Outcome: Progressing  Note: Pt reports surgical pain on finger. Medicated per MAR. Pt educated on use of call light for any pain management assistance.      Problem: Self Care  Goal: Patient will have the ability to perform ADLs independently or with assistance (bathe, groom, dress, toilet and feed)  Outcome: Progressing

## 2024-01-06 NOTE — PROGRESS NOTES
4 Eyes Skin Assessment Completed by LLUVIA Rossi and LLUVIA Kennedy.    Head WDL  Ears WDL  Nose WDL  Mouth WDL  Neck WDL  Breast/Chest WDL  Shoulder Blades WDL  Spine WDL  (R) Arm/Elbow/Hand edema, s/p I&D on middle digit. Dressing in place, redness, blanching  (L) Arm/Elbow/Hand Redness, Blanching, and Bruising  Abdomen WDL  Groin Redness  Scrotum/Coccyx/Buttocks Redness, Blanching, and Discoloration, wound consult in place  (R) Leg Scar and Edema, scabs  (L) Leg Scar and Edema, wound on left leg, patient follows up in outpatient wound clinic, dressing in place and patient refusing to allow dressing to come off. scabs  (R) Heel/Foot/Toe dryness, boggy  (L) Heel/Foot/Toe Boggy, dryness          Devices In Places Tele Box and Nasal Cannula      Interventions In Place Pressure Redistribution Mattress    Possible Skin Injury Yes    Pictures Uploaded Into Epic Yes  Wound Consult Placed Yes  RN Wound Prevention Protocol Ordered Yes

## 2024-01-06 NOTE — WOUND TEAM
[facility]  Inpatient Services  Initial Wound and Skin Care Evaluation    Admission Date: 2024     Last order of IP CONSULT TO WOUND CARE was found on 2024 from Hospital Encounter on 1/3/2024     HPI, PMH, SH: Reviewed    Past Surgical History:   Procedure Laterality Date    DE DEBRIDE SKIN SUBQ TISSUE FIRST 20 SQ CM Right 2024    Procedure: IRRIGATION AND DEBRIDEMENT, WOUND,RIGHT MIDDLE FINGER;  Surgeon: Travis Weir M.D.;  Location: SURGERY [facility];  Service: Orthopedics    FINGER AMPUTATION Right 2024    Procedure: RIGHT MIDDLE FINGER REVISION AMPUTATION FOR INFECTION. RIGHT HAND IRRIGATION AND DEBRIDEMENT;  Surgeon: Travis Weir M.D.;  Location: SURGERY [facility];  Service: Orthopedics    PACEMAKER REVISION      KNEE ARTHROPLASTY TOTAL  2012    Performed by ISABELLA MARK at [facility] ORS    AV FISTULA CREATION Right     CATARACT EXTRACTION WITH IOL      KNEE ARTHROPLASTY TOTAL Left      Social History     Tobacco Use    Smoking status: Former     Current packs/day: 0.00     Average packs/day: 1 pack/day for 15.0 years (15.0 ttl pk-yrs)     Types: Cigarettes     Start date: 1956     Quit date: 1971     Years since quittin.9    Smokeless tobacco: Never    Tobacco comments:     quit 40yrs ago   Substance Use Topics    Alcohol use: Not Currently     No chief complaint on file.    Diagnosis: Osteomyelitis of hand, right, acute (HCC) [M86.141]  Finger osteomyelitis (HCC) [M86.9]    Unit where seen by Wound Team:      WOUND CONSULT RELATED TO:  sacrum and heels    WOUND TEAM PLAN OF CARE - Frequency of Follow-up:   Nursing to follow dressing orders written for wound care. Contact wound team if area fails to progress, deteriorates or with any questions/concerns if something comes up before next scheduled follow up (See below as to whether wound is following and frequency of wound follow up)   Not following, consult as  needed  -      WOUND HISTORY:   Pt is an 82 yo male who presented to the ED for R middle finger amputation with Dr Weir.  Hx of DM ESRF CHF COPD.  Admitted for management of multiple medical problem chandra operatively.  Pt under the care of [facility] wound clinic for L leg.  Pt states he is leaving hospital today 1/6/24 regardless of recommendations     WOUND ASSESSMENT/LDA    Sacrum - scar tissue with site that pt says drains on and off - currently not draining  R heel intact  L heel could not be observed due to coompression wrap - pt states he has an appt with [facility] wound clinic for change.    R 3rd finger amp site - pt politely but adamently refusing ACE.  Noticed drainage through cotton roll so changed secondary dressing and placed cotton gauze, and cotton roll over xeroform dressing, instructed pt to keep hand elevated at all times, educated re importance of edema reduction.                        Vascular:    PABLO:   No results found.    Lab Values:    Lab Results   Component Value Date/Time    WBC 13.1 (H) 01/06/2024 12:52 AM    RBC 3.59 (L) 01/06/2024 12:52 AM    HEMOGLOBIN 11.1 (L) 01/06/2024 12:52 AM    HEMATOCRIT 35.8 (L) 01/06/2024 12:52 AM    HBA1C 6.5 (H) 06/05/2023 04:20 AM    HBA1C 7.2 11/22/2010 01:56 AM         Culture Results show:  No results found for this or any previous visit (from the past 720 hour(s)).    Pain Level/Medicated:  None, Tolerated without pain medication       INTERVENTIONS BY WOUND TEAM:  Chart and images reviewed. Discussed with bedside RN. All areas of concern (based on picture review, LDA review and discussion with bedside RN) have been thoroughly assessed. Documentation of areas based on significant findings. This RN in to assess patient. Performed standard wound care which includes appropriate positioning, dressing removal and non-selective debridement. Pictures and measurements obtained weekly if/when required.    Wound:  off loading recommendations      Advanced Wound  Care Discharge Planning  Number of Clinicians necessary to complete wound care: 1  Is patient requiring IV pain medications for dressing changes:  No   Length of time for dressing change 60 min. (This does not include chart review, pre-medication time, set up, clean up or time spent charting.)    Interdisciplinary consultation: Patient, Bedside RN (), .      EVALUATION / RATIONALE FOR TREATMENT:     Date:  01/06/24  Wound Status:  Initial evaluation    Incision R hand approximated.  No pressure injuries R heel or sacrum.  Off loading orders written         Goals: remain pressure injury free.    NURSING PLAN OF CARE ORDERS:  Dressing changes: See Dressing Care orders    NUTRITION RECOMMENDATIONS   Wound Team Recommendations:  N/A    DIET ORDERS (From admission to next 24h)       Start     Ordered    01/05/24 1729  Diet Order Diet: Cardiac; Second Modifier: (optional): Consistent CHO (Diabetic)  ALL MEALS        Question Answer Comment   Diet: Cardiac    Second Modifier: (optional) Consistent CHO (Diabetic)        01/05/24 1728                    PREVENTATIVE INTERVENTIONS:    Q shift Chet - performed per nursing policy  Q shift pressure point assessments - performed per nursing policy    Surface/Positioning  Reposition q 2 hours - Currently in Place  Waffle overlay  - Currently in Place    Offloading/Redistribution  Heel offloading dressing (Silicone dressing) - Ordered    Anticipated discharge plans:  Outpatient Wound Center        Vac Discharge Needs:  Vac Discharge plan is purely a recommendation from wound team and not a requirement for discharge unless otherwise stated by physician.  Not Applicable Pt not on a wound vac

## 2024-01-06 NOTE — CONSULTS
"[facility] -  CONSULTATION NOTE               Author: Christa Aleman M.D. Date & Time: 1/6/2024  1:29 PM       REASON FOR CONSULTATION:   - Inpatient hemodialysis management.    CHIEF COMPLAINT:   -  \" right finger amputation \"    HISTORY OF PRESENT ILLNESS:    83 y.o. male with a past medical history of primary hypertension, diabetes, end-stage renal disease, chronic heart failure, chronic obstructive pulmonary disease with chronic hypoxemic respiratory failure on 5 L of oxygen who has osteomyelitis of his right middle finger who presented 1/5/2024 for elective amputation of the right middle finger with Dr Weir.   Patient on HD TTS- Reports intradialytic hypotension with nausea with UF goals beyond 2.5 L   net. No other complaints  No F/C/N/V/CP/SOB.  No melena, hematochezia, hematemesis.  No HA, visual changes, or abdominal pain.    REVIEW OF SYSTEMS:    10 point ROS was performed and is as per HPI or otherwise negative    PAST MEDICAL HISTORY:   - ESRD  - HTN  - Anemia of CKD  - CKD-MBD    PAST SURGICAL HISTORY:   - s/p Right middle finger amputation  Right UE AV fistula    FAMILY HISTORY:   - Reviewed and non contributory to current illness    SOCIAL HISTORY:   - No tobacco  - No EtOH  - No illicits    HOME MEDICATIONS:   - Reviewed and documented in chart    LABORATORY STUDIES:   - Reviewed and documented in chart    ALLERGIES:  Tetanus toxoid    VS:  /46   Pulse 85   Temp 36.3 °C (97.4 °F) (Axillary)   Resp 18   Ht 1.727 m (5' 8\")   Wt 96.9 kg (213 lb 10 oz)   SpO2 99%   BMI 32.48 kg/m²   Physical Exam  Vitals reviewed.   Constitutional:       Appearance: He is obese.   HENT:      Head: Normocephalic and atraumatic.      Mouth/Throat:      Mouth: Mucous membranes are moist.   Eyes:      Extraocular Movements: Extraocular movements intact.      Conjunctiva/sclera: Conjunctivae normal.      Pupils: Pupils are equal, round, and reactive to light.   Cardiovascular:      Rate " and Rhythm: Normal rate.      Heart sounds: Normal heart sounds.      Comments: Right UE AVF with bruit and thrill  Pulmonary:      Effort: Pulmonary effort is normal.      Breath sounds: Decreased breath sounds present.   Abdominal:      General: Bowel sounds are normal.      Palpations: Abdomen is soft.   Musculoskeletal:         General: Swelling present.      Cervical back: Neck supple.      Comments: Right UE swelling and dressing on right hand   Skin:     General: Skin is warm and dry.   Neurological:      General: No focal deficit present.      Mental Status: He is alert and oriented to person, place, and time.            FLUID BALANCE:  In: 200 [P.O.:200]  Out: -     LABS:  Recent Labs     01/05/24  1049 01/06/24  0052   SODIUM 136 134*   POTASSIUM 3.9 4.5   CHLORIDE 98 97   CO2 25 17*   GLUCOSE 129* 167*   BUN 52* 60*   CREATININE 3.49* 4.20*   CALCIUM 9.2 9.1        IMAGING:  - All imaging reviewed from admission to present day    IMPRESSION:  # ESRD  - Etiology likely 2/2 HTN  # HTN  - Goal BP < 140/90  # Anemia of CKD  - Goal Hgb 10-11  - At goal  # CKD-MBD  - Ca 9.1  -cCa-10.5  # Hypotension  - Intradialytic    PLAN:  - TTS iHD  - Plan HD today  - UF  around 2.5 L as tolerated  - Fluid restriction reviewed with patient  - UF as tolerated  - Midodrine prn  - Discontinue calcitriol  - No dietary protein restrictions  - Dose all meds per ESRD  - IV antibiotics as per hospitalist    Thank you for the consultation!

## 2024-01-06 NOTE — PROGRESS NOTES
"Patient seen and examined  Complains of minimal pain. Had issues with dressing being \"too tight\" dressing changed and is improved.     /54   Pulse 85   Temp 36.4 °C (97.5 °F) (Axillary)   Resp 18   Ht 1.727 m (5' 8\")   Wt 96.9 kg (213 lb 10 oz)   SpO2 97%     Recent Labs     01/05/24  1049 01/06/24  0052   WBC 12.4* 13.1*   RBC 4.02* 3.59*   HEMOGLOBIN 12.1* 11.1*   HEMATOCRIT 38.6* 35.8*   MCV 96.0 99.7*   MCH 30.1 30.9   MCHC 31.3* 31.0*   RDW 54.3* 56.6*   PLATELETCT 188 199   MPV 10.0 10.6       No acute distress  Dressing clean dry and intact  Neurovascularly intact    POD#1  S/P ID right hand with 3rd ray amputation    Plan:  DVT Prophylaxis- TEDS/SCDs, heparin while in hospital,   Weight Bearing Status-NWB right hand  PT/OT  Antibiotics: unasyn  Continue plan per hospitalist  Followup ortho 14 days postop  Case Coordination          "

## 2024-01-07 PROBLEM — M86.9 FINGER OSTEOMYELITIS (HCC): Status: RESOLVED | Noted: 2024-01-01 | Resolved: 2024-01-01

## 2024-01-07 NOTE — FACE TO FACE
Face to Face Supporting Documentation - Home Health    The encounter with this patient was in whole or in part the primary reason for home health admission.    Date of encounter:   Patient:                    MRN:                       YOB: 2023  Kameron Elias  [id number]  1940     Home health to see patient for:  Skilled Nursing care for assessment, interventions & education, Wound Care, Physical Therapy evaluation and treatment, and Occupational therapy evaluation and treatment    Skilled need for:  Surgical Aftercare Finger amputation    Skilled nursing interventions to include:  Wound Care    Homebound status evidenced by:  Need the aid of supportive devices such as crutches, canes, wheelchairs or walkers or Require the use of special transportation. Leaving home requires a considerable and taxing effort. There is a normal inability to leave the home.    Community Physician to provide follow up care: Dorian De M.D.     Optional Interventions? No      I certify the face to face encounter for this home health care referral meets the CMS requirements and the encounter/clinical assessment with the patient was, in whole, or in part, for the medical condition(s) listed above, which is the primary reason for home health care. Based on my clinical findings: the service(s) are medically necessary, support the need for home health care, and the homebound criteria are met.  I certify that this patient has had a face to face encounter by myself.  Alona Melendrez M.D. - NPI: [id number]

## 2024-01-07 NOTE — CONSULTS
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

## 2024-01-07 NOTE — PROGRESS NOTES
Assumed care of patient at bedside report from day shift RN. Updated on POC. Patient currently A & O x 4, on 5 L O2 99%, up in bed, without complaints of acute pain. Assessment completed. Call light within reach. Whiteboard updated. Fall precautions in place. Bed locked and in lowest position. All questions answered. No other needs indicated at this time.

## 2024-01-07 NOTE — PROGRESS NOTES
"[facility] -  PROGRESS NOTE               Author: Christa Aleman M.D. Date & Time: 1/7/2024  11:41 AM     HPI:  83 y.o. male with a past medical history of primary hypertension, diabetes, end-stage renal disease, chronic heart failure, chronic obstructive pulmonary disease with chronic hypoxemic respiratory failure on 5 L of oxygen who has osteomyelitis of his right middle finger who presented 1/5/2024 for elective amputation of the right middle finger with Dr Weir.   Patient on HD TTS- Reports intradialytic hypotension with nausea with UF goals beyond 2.5 L   net. No other complaints  No F/C/N/V/CP/SOB.  No melena, hematochezia, hematemesis.  No HA, visual changes, or abdominal pain.    DAILY NEPHROLOGY SUMMARY:  1/06- Initial consult done  1/07- Patient s/p HD yesterday; tolerated treatment well. Reports feeling well    REVIEW OF SYSTEMS:    10 point ROS reviewed and is as per HPI/daily summary or otherwise negative    PMH/PSH/SH/FH: Reviewed and unchanged since admission note  CURRENT MEDICATIONS: Reviewed from admission to present day    VS:  /42   Pulse 86   Temp 36.4 °C (97.5 °F) (Oral)   Resp 16   Ht 1.727 m (5' 8\")   Wt 95.5 kg (210 lb 8.6 oz)   SpO2 100%   BMI 32.01 kg/m²   Physical Exam  Vitals reviewed.   Constitutional:       Appearance: He is obese.   HENT:      Head: Normocephalic and atraumatic.      Mouth/Throat:      Mouth: Mucous membranes are moist.   Eyes:      Extraocular Movements: Extraocular movements intact.      Conjunctiva/sclera: Conjunctivae normal.      Pupils: Pupils are equal, round, and reactive to light.   Cardiovascular:      Rate and Rhythm: Normal rate.      Heart sounds: Normal heart sounds.      Comments: Right UE AVF - positive bruit and thrill  Pulmonary:      Effort: Pulmonary effort is normal.      Breath sounds: Normal breath sounds.   Abdominal:      General: Bowel sounds are normal.      Palpations: Abdomen is soft. "   Musculoskeletal:         General: Swelling present.      Comments: Right hand dressing   Skin:     General: Skin is warm and dry.   Neurological:      General: No focal deficit present.      Mental Status: He is alert and oriented to person, place, and time.   Psychiatric:         Mood and Affect: Mood normal.         Fluids:  In: 740 [P.O.:240; Dialysis:500]  Out: 2500     LABS:  Recent Labs     01/05/24  1049 01/06/24  0052 01/07/24  0145   SODIUM 136 134* 136   POTASSIUM 3.9 4.5 4.1   CHLORIDE 98 97 100   CO2 25 17* 24   GLUCOSE 129* 167* 159*   BUN 52* 60* 39*   CREATININE 3.49* 4.20* 3.26*   CALCIUM 9.2 9.1 8.7       IMPRESSION:    # ESRD  - Etiology likely 2/2 HTN  # HTN  - Goal BP < 140/90  - at goal  # Anemia of CKD  - Goal Hgb 10-11  - At goal  # CKD-MBD  - Ca 8.7  # Hypotension  - Intradialytic     PLAN:  - TTS iHD  - s/p HD yesterday  - No urgent indication today  - UF  as  tolerated  - Fluid restriction reviewed with patient  - UF as tolerated  - Midodrine prn  - Discontinued calcitriol  - No dietary protein restrictions  - Dose all meds per ESRD  - IV antibiotics as per hospitalist

## 2024-01-07 NOTE — CARE PLAN
The patient is Stable - Low risk of patient condition declining or worsening    Shift Goals  Clinical Goals: Pain management  Patient Goals: Have a bowel movement  Family Goals: HRAIKA    Progress made toward(s) clinical / shift goals:    Problem: Fall Risk  Goal: Patient will remain free from falls  Outcome: Progressing   Bed locked and in lowest position. Call light within reach. Pt has remained free of falls during this shift.   Problem: Knowledge Deficit - Standard  Goal: Patient and family/care givers will demonstrate understanding of plan of care, disease process/condition, diagnostic tests and medications  Outcome: Progressing   Pt updated on POC, pt verbalized an understanding. Pt educated on medications being administered. All questions answered at this time.   Problem: Pain - Standard  Goal: Alleviation of pain or a reduction in pain to the patient’s comfort goal  Outcome: Progressing   Pt has remained free of pain during this shift. See flow sheets and MAR.     Patient is not progressing towards the following goals:

## 2024-01-07 NOTE — DISCHARGE PLANNING
Case Management Discharge Planning    Admission Date: 1/5/2024  GMLOS: 3  ALOS: 2    6-Clicks ADL Score: 19  6-Clicks Mobility Score: 14  PT and/or OT Eval ordered: Yes  Post-acute Referrals Ordered: Yes  Post-acute Choice Obtained: Yes  Has referral(s) been sent to post-acute provider:  No      Anticipated Discharge Dispo: Discharge Disposition: D/T to home under HHA care in anticipation of covered skilled care (06)    DME Needed: No    Action(s) Taken: Choice obtained    Escalations Completed: None    Medically Clear: Yes    Course of Action  **1115  Patient to discharge today. PT recommending home health. LSW spoke to patient at bedside and obtained choice for [facility] (1) and [facility] (2).    **1217  LSW requested home health orders from provider.    **1249  Home health referral sent to [facility], per choice.

## 2024-01-07 NOTE — PROGRESS NOTES
Hemodialysis done today, started @ 1454 and ended @ 1756 with net UF= 2000 ml, unable to remove more fluid due to dropping lower BP, Dr Aleman notified, VSS post HD. Report given to LLUVIA Man. See PATH flow sheet for details.

## 2024-01-07 NOTE — THERAPY
Physical Therapy   Initial Evaluation     Patient Name: Kameron Elias  Age:  83 y.o., Sex:  male  Medical Record #: [id number]  Today's Date: 1/7/2024     Precautions  Precautions: (P) Fall Risk    Assessment  Patient is 83 y.o. male with a diagnosis of ESRD, s/p  Right third ray amputation due to  Right third metacarpal osteomyelitis.  Pt denies need for inpatient PT assessment, has all required DME and paid caregiver at home. Pt agreeable to home health services.   Plan         DC Equipment Recommendations: (P) None  Discharge Recommendations: (P) Recommend home health for continued physical therapy services            01/07/24 1136   Charge Group   PT Self Care / Home Evaluation (Units) 1   Total Time Spent   PT Self Care/Home Evaluation Time Spent (Mins) 15   Initial Contact Note    Initial Contact Note Order Received and Verified. Physical Therapy Evaluation NOT Completed Because Patient Does Not Require Acute Physical Therapy at this Time.  (pt denied need for inpatient PT assessment,)   Precautions   Precautions Fall Risk   Pain 0 - 10 Group   Location Hand   Location Orientation Right   Therapist Pain Assessment 4   Prior Living Situation   Prior Services Continuous (24 Hour) Care Giving Family;Intermittent Physical Support for ADL Per Service   Housing / Facility [address]   Steps Into Home   (ramp)   Steps In Home 0   Equipment Owned Front-Wheel Walker;Wheelchair;Bed Side Commode   Lives with - Patient's Self Care Capacity Spouse;Attendant / Paid Care Giver   Comments Pt states he resides in a single level home with his spouse. Pt states he has all required DME and a paid caregiver who assists as needed including  transportation . Pt states he is confident with his safety in his current situation. Pt denies need for inpatient PT assessment, states his caregiver has been with him for 3 years and know what to do.   Prior Level of Functional Mobility   Bed Mobility Required Assist   Transfer Status Required  Assist   Assistive Devices Used Wheelchair   Wheelchair Required Assist   History of Falls   History of Falls No   Cognition    Level of Consciousness Alert   Anticipated Discharge Equipment and Recommendations   DC Equipment Recommendations None   Discharge Recommendations Recommend home health for continued physical therapy services   Interdisciplinary Plan of Care Collaboration   IDT Collaboration with  Nursing   Patient Position at End of Therapy In Bed;Call Light within Reach;Tray Table within Reach;Phone within Reach   Collaboration Comments RN updated.   Session Information   Date / Session Number  1/7 Self care only. .

## 2024-01-07 NOTE — DISCHARGE INSTRUCTIONS
Please follow-up with orthopedic surgery within 12 days.    Please follow-up with wound care within 2 to 3 days.    Please follow-up with outpatient dialysis and your nephrologist as scheduled    Please follow-up with your primary care physician within 2 to 3 days    Please continue linezolid 1 tablet every 12 hours until 1/17/2024.

## 2024-01-07 NOTE — PROGRESS NOTES
Telemetry Shift Summary    Rhythm Paced  HR Range 80s-100s, 2.6 second pause at 1030 (MD made aware)  Ectopy frequent PVC, rare triplets/couplets  Measurements --/0.10/0.42        Normal Values  Rhythm SR  HR Range    Measurements 0.12-0.20 / 0.06-0.10  / 0.30-0.52

## 2024-01-07 NOTE — PROGRESS NOTES
Telemetry Shift Summary     Rhythm: Paced  HR: 70-85  Ectopy: r Pvc  Measurements: -/0.10/0.34    Normal Values  Rhythm: SR  HR:   Measurements: 0.12-0.20 / 0.04-0.10 / 0.30-0.52    Strip reviewed and placed in chart

## 2024-01-07 NOTE — PROGRESS NOTES
Pharmacy Vancomycin Kinetics Note for 1/7/2024     83 y.o. male on Vancomycin day # 3     Vancomycin Indication (Trough based Dosing): Osteomyelitis (goal trough 10-15)    Provider specified end date: 01/10/24    Active Antibiotics (From admission, onward)      Ordered     Ordering Provider       Any Jan 7, 2024  7:37 AM    01/07/24 0737  vancomycin 1750 mg/500mL NS IVPB premix  (vancomycin (VANCOCIN) IV (LD + Maintenance))  ONCE         Alona Melendrez M.D.       Fri Jan 5, 2024  1:26 PM    01/05/24 1326  MD Alert...Vancomycin per Pharmacy  PHARMACY TO DOSE        Question:  Indication(s) for vancomycin?  Answer:  Osteomyelitis    Casey Smart M.D.       Fri Jan 5, 2024  1:23 PM    01/05/24 1323  ampicillin/sulbactam (Unasyn) 3 g in  mL IVPB  EVERY 24 HOURS         Casey Smart M.D.            Dosing Weight: 92 kg (202 lb 13.2 oz)      Admission History: Admitted on 1/5/2024 for Osteomyelitis of hand, right, acute (HCC) [M86.141]  Finger osteomyelitis (HCC) [M86.9]  Pertinent history: 85 yo male on day 3 of Unasyn & Vancomycin for Right 3rd metacarpal osteomyelitis s/p right third ray amputation on Jan 5th. Pathology report is pending. Wound culture growing S. aureus with susceptiblities pending. ESRD with dialysis schedule Tu/Th/Sat.    Allergies:     Tetanus toxoid     Pertinent cultures to date:     Results       Procedure Component Value Units Date/Time    BLOOD CULTURE [[id number]] Collected: 01/06/24 0052    Order Status: Completed Specimen: Blood from Peripheral Updated: 01/07/24 0749     Significant Indicator NEG     Source BLD     Site PERIPHERAL     Culture Result No Growth  Note: Blood cultures are incubated for 5 days and  are monitored continuously.Positive blood cultures  are called to the RN and reported as soon as  they are identified.  Blood culture testing and Gram stain, if indicated, are  performed at [facility], [address] [place] [address].   "Positive blood cultures are  sent to [facility], [address] [place] [address], for organism identification and  susceptibility testing.      Narrative:      Per Hospital Policy: Only change Specimen Src: to \"Line\" if  specified by physician order.  Left Hand    BLOOD CULTURE [[id number]] Collected: 01/06/24 0052    Order Status: Completed Specimen: Blood from Peripheral Updated: 01/07/24 0749     Significant Indicator NEG     Source BLD     Site PERIPHERAL     Culture Result No Growth  Note: Blood cultures are incubated for 5 days and  are monitored continuously.Positive blood cultures  are called to the RN and reported as soon as  they are identified.  Blood culture testing and Gram stain, if indicated, are  performed at [facility], [address] [place] [address].  Positive blood cultures are  sent to [facility], [address] [place] [address], for organism identification and  susceptibility testing.      Narrative:      Per Hospital Policy: Only change Specimen Src: to \"Line\" if  specified by physician order.    separate site on hand from other cxbld order  Left Hand    MRSA By PCR (Amp) [[id number]] Collected: 01/05/24 2027    Order Status: Completed Specimen: Respirate from Nares Updated: 01/06/24 1939     MRSA by PCR POSITIVE    Narrative:      Collected By: [id number] QUINN MODI    Fungal Smear [[id number]] Collected: 01/05/24 1221    Order Status: Completed Specimen: Tissue Updated: 01/06/24 1727     Significant Indicator NEG     Source TISS     Site R hand     Fungal Smear Results No fungal elements seen.    Narrative:      1 finger tissue(right hand)  Surgery Specimen    GRAM STAIN [[id number]]  (Abnormal) Collected: 01/05/24 1221    Order Status: Completed Specimen: Tissue Updated: 01/06/24 1727     Significant Indicator .     Source TISS     Site R hand     Gram Stain Result Moderate WBCs.  Moderate Gram positive cocci.      Narrative:   "    1 finger tissue(right hand)  Surgery Specimen    Acid Fast Stain [[id number]] Collected: 01/05/24 1221    Order Status: Completed Specimen: Tissue Updated: 01/06/24 1727     Significant Indicator NEG     Source TISS     Site R hand     AFB Smear Results No acid fast bacilli seen.    Narrative:      1 finger tissue(right hand)  Surgery Specimen    Fungal Culture [[id number]] Collected: 01/05/24 1221    Order Status: Completed Specimen: Tissue Updated: 01/06/24 1727     Significant Indicator NEG     Source TISS     Site R hand     Culture Result Culture in progress.     Fungal Smear Results No fungal elements seen.    Narrative:      1 finger tissue(right hand)  Surgery Specimen    Anaerobic Culture [[id number]] Collected: 01/05/24 1221    Order Status: Completed Specimen: Tissue Updated: 01/06/24 1727     Significant Indicator NEG     Source TISS     Site R hand     Culture Result Culture in progress.    Narrative:      1 finger tissue(right hand)  Surgery Specimen    CULTURE TISSUE W/ GRM STAIN [[id number]]  (Abnormal) Collected: 01/05/24 1221    Order Status: Completed Specimen: Tissue Updated: 01/06/24 1727     Significant Indicator POS     Source TISS     Site R hand     Culture Result -     Gram Stain Result Moderate WBCs.  Moderate Gram positive cocci.       Culture Result Staphylococcus aureus  Moderate growth      Narrative:      1 finger tissue(right hand)  Surgery Specimen    AFB Culture [[id number]] Collected: 01/05/24 1221    Order Status: Completed Specimen: Tissue Updated: 01/06/24 1727     Significant Indicator NEG     Source TISS     Site R hand     Culture Result Culture in progress.     AFB Smear Results No acid fast bacilli seen.    Narrative:      1 finger tissue(right hand)  Surgery Specimen            Labs:     Estimated Creatinine Clearance: 19.6 mL/min (A) (by C-G formula based on SCr of 3.26 mg/dL (H)).  Recent Labs     01/05/24  1049 01/06/24  0052 01/07/24  0145   WBC 12.4* 13.1* 11.7*  "    Recent Labs     24  1049 24  0052 24  0145   BUN 52* 60* 39*   CREATININE 3.49* 4.20* 3.26*   ALBUMIN  --  2.2*  --        Intake/Output Summary (Last 24 hours) at 2024 0843  Last data filed at 2024 1815  Gross per 24 hour   Intake 740 ml   Output 2500 ml   Net -1760 ml      /43   Pulse 86   Temp 36.4 °C (97.5 °F) (Oral)   Resp 18   Ht 1.727 m (5' 8\")   Wt 95.5 kg (210 lb 8.6 oz)   SpO2 96%  Temp (24hrs), Av.6 °C (97.8 °F), Min:36.3 °C (97.4 °F), Max:36.9 °C (98.5 °F)      List concerns for Vancomycin clearance:     Age;Obesity;ESRD;Malnutrition/Low albumin;CHF    Pharmacokinetics:     Trough kinetics:   Recent Labs     24  0302   VANCORANDOM 14.9       A/P:     -  Vancomycin dose: pulse dose 1.75 gm this am.    -  Next vancomycin level(s):    - Vanco random with am labs    -  Comments: Vanco currently scheduled with end date 1/10 pending culture and pathology results. Pharmacy to follow up on random  if therapy continues.    Chapincito Collado, PharmD    "

## 2024-01-07 NOTE — DISCHARGE SUMMARY
Discharge Summary    CHIEF COMPLAINT ON ADMISSION  No chief complaint on file.      Reason for Admission  Osteomyelitis of hand, right, acut*     Admission Date  1/5/2024    CODE STATUS  Full Code    HPI & HOSPITAL COURSE  Kameron Elias is a 83 y.o. male admitted 1/5/2024 for elective amputation of right middle finger due to right third metacarpal osteomyelitis.  He has a history of chronic diastolic heart failure, ESRD (HD on TTS), type 2 diabetes, hypertension, dyslipidemia, GERD, COPD (on 5 L/min nasal cannula).      Previous wound cultures from 4/6/2023 grew actinomyces hominis, Staph aureus, Peptonophilis lactrimalis.      Unasyn and vancomycin empirically started on admission. Patient underwent right third ray amputation on 1/5/2023.  Surgical wound cultures from 1/5/2024 grew MRSA. ID were consulted. Linezolid started, to complete course on 1/17/2024.     Patient has been advised to follow up with orthopedic surgery in 12 days, wound care in the next few days, PCP within 2 to 3 days, nephrology and dialysis as scheduled.      Patient is afebrile and hemodynamically stable. Blood cultures have been negative.       Therefore, he is discharged in fair and stable condition to home with close outpatient follow-up.      Discharge Date  1/7/2024    FOLLOW UP ITEMS POST DISCHARGE  Orthopedic Surgery (Dr. Weir) in 12 days  Wound care in 2-3 days  PCP in 2-3 days  Nephrology and dialysis as scheduled    DISCHARGE DIAGNOSES  Principal Problem (Resolved):    Finger osteomyelitis (HCC) (POA: Yes)  Active Problems:    ESRD (end stage renal disease) (HCC) (POA: Yes)    GERD (gastroesophageal reflux disease) (POA: Yes)    Essential hypertension (POA: Yes)    DM (diabetes mellitus) (HCC) (POA: Yes)    Dyslipidemia (POA: Yes)    Anemia due to chronic kidney disease (POA: Yes)    Chronic obstructive pulmonary disease with acute exacerbation (HCC) (POA: Yes)    Hypotension due to hypovolemia (POA: Yes)    Chronic diastolic heart  failure (HCC) (POA: Yes)    BMI 30.0-30.9,adult (POA: Yes)    ACP (advance care planning) (POA: Yes)      FOLLOW UP  Future Appointments   Date Time Provider Department Center   1/10/2024 10:45 AM Blu hZong P.A.-C. [facility] Paulie De M.D.  [address]  [facility] [address]  [phone]    Follow up in 1 week(s)        MEDICATIONS ON DISCHARGE     Medication List        START taking these medications        Instructions   linezolid 600 MG Tabs  Commonly known as: Zyvox   Take 1 Tablet by mouth every 12 hours for 7 days.  Dose: 600 mg            CONTINUE taking these medications        Instructions   Aleve 220 MG Caps  Generic drug: Naproxen Sodium   Take 1 Capsule by mouth 2 times a day as needed (pain).  Dose: 1 Capsule     atorvastatin 40 MG Tabs  Commonly known as: Lipitor   Take 40 mg by mouth every day.  Dose: 40 mg     calcitRIOL 0.25 MCG Caps  Commonly known as: Rocaltrol   Take 0.25 mcg by mouth every day.  Dose: 0.25 mcg     ezetimibe 10 MG Tabs  Commonly known as: Zetia   Take 10 mg by mouth every day.  Dose: 10 mg     midodrine 5 MG Tabs  Commonly known as: Proamatine   Take 2.5 mg by mouth.  Dose: 2.5 mg     NovoLOG FlexPen 100 UNIT/ML injection PEN  Generic drug: insulin aspart   3 times a day before meals. Per sliding scale, pt states does not take in the morning, his BS is usually lower     omeprazole 20 MG delayed-release capsule  Commonly known as: PriLOSEC   Take 20 mg by mouth every day.  Dose: 20 mg     polyethylene glycol/lytes Pack  Commonly known as: Miralax   Take 17 g by mouth every day.  Dose: 17 g     Tresiba FlexTouch 200 UNIT/ML Sopn  Generic drug: Insulin Degludec   Inject 8-10 Units under the skin see administration instructions. Takes 10 units at dinner time   Takes 8-10 units before bed  Dose: 8-10 Units     vitamin D 1000 Unit Tabs  Commonly known as: Cholecalciferol   Take 1,000 Units by mouth every day.  Dose: 1,000 Units            STOP taking these  medications      amoxicillin 500 MG Caps  Commonly known as: Amoxil     cyclobenzaprine 5 mg tablet  Commonly known as: Flexeril     dilTIAZem  MG Cp24  Commonly known as: Cardizem CD     doxycycline 100 MG Tabs  Commonly known as: Vibramycin     oxyCODONE immediate-release 5 MG Tabs  Commonly known as: Roxicodone              Allergies  Allergies   Allergen Reactions    Tetanus Toxoid Unspecified and Rash       DIET  Orders Placed This Encounter   Procedures    Diet Order Diet: Cardiac; Second Modifier: (optional): Consistent CHO (Diabetic)     Standing Status:   Standing     Number of Occurrences:   1     Order Specific Question:   Diet:     Answer:   Cardiac [6]     Order Specific Question:   Second Modifier: (optional)     Answer:   Consistent CHO (Diabetic) [4]       ACTIVITY  As tolerated.      CONSULTATIONS  ID  Orthopedic surgery    PROCEDURES  Finger amputation    LABORATORY  Lab Results   Component Value Date    SODIUM 136 01/07/2024    POTASSIUM 4.1 01/07/2024    CHLORIDE 100 01/07/2024    CO2 24 01/07/2024    GLUCOSE 159 (H) 01/07/2024    BUN 39 (H) 01/07/2024    CREATININE 3.26 (H) 01/07/2024        Lab Results   Component Value Date    WBC 11.7 (H) 01/07/2024    HEMOGLOBIN 10.4 (L) 01/07/2024    HEMATOCRIT 32.6 (L) 01/07/2024    PLATELETCT 193 01/07/2024        Total time of the discharge process exceeds 38 minutes.

## 2024-01-30 NOTE — DOCUMENTATION QUERY
[facility]                                                                       Query Response Note      PATIENT:               ARSH MILLER  ACCT #:                  [id number]  MRN:                     [id number]  :                      1940  ADMIT DATE:       2024 9:54 AM  DISCH DATE:        2024 3:10 PM  RESPONDING  PROVIDER #:        [id number]           QUERY TEXT:    If known, please clarify the cause of osteomyelitis.                Documentation indicators that raised basis for question:    OP report - multiple medical comorbidities including type 2 diabetes, ESRD, CHF s/p R finger amputation for osteomyelitis...previous dehisced wound draining purulent fluid...small pocket of purulent fluid in the dorsal aspect of the wound more towards 4th metacarpal     Pathology result - bone demonstrating acute osteomyelitis    Treatment - Right third ray amputation    Risk factors - DM type 2, previous amputation w/ wound dehiscence and draining purulent fluid    Thank you,  Iliana De La Fuente BSN  Clinical   Connect via Replise  Options provided:   -- Condition osteomyelitis is unexpected and a complication of the previous amputation performed   -- Condition osteomyelitis is a complication of the dehiscence/nonhealing wound related to diabetes   -- Condition osteomyelitis is related to the progression of previous osteomyelitis   -- Other explanation, (please specify the other explanation)   -- Unable to determine      Query created by: Iliana De La Fuente on 2024 9:35 AM    RESPONSE TEXT:    Condition osteomyelitis is a complication of the dehiscence/nonhealing wound related to diabetes          Electronically signed by:  BELEM AVELAR MD 2024 7:22 PM

## 2024-02-19 LAB
MYCOBACTERIUM SPEC CULT: NORMAL
RHODAMINE-AURAMINE STN SPEC: NORMAL
SIGNIFICANT IND 70042: NORMAL
SITE SITE: NORMAL
SOURCE SOURCE: NORMAL

## (undated) DEVICE — SUTURE 2 ETHIBOND OS-4 (36PK/BX)

## (undated) DEVICE — TUBE, CULTURE AEROBIC

## (undated) DEVICE — BLADE SAW SAGITTAL MICRO 25.0X9.0MM (1EA)

## (undated) DEVICE — SENSOR OXIMETER ADULT SPO2 RD SET (20EA/BX)

## (undated) DEVICE — TUBE CONNECTING SUCTION - CLEAR PLASTIC STERILE 72 IN (50EA/CA)

## (undated) DEVICE — GOWN WARMING STANDARD FLEX - (30/CA)

## (undated) DEVICE — SUTURE 3-0 ETHILON FS-1 - (36/BX) 30 INCH

## (undated) DEVICE — SODIUM CHL IRRIGATION 0.9% 1000ML (12EA/CA)

## (undated) DEVICE — KIT  I.V. START (100EA/CA)

## (undated) DEVICE — SUTURE GENERAL

## (undated) DEVICE — WATER IRRIGATION STERILE 1000ML (12EA/CA)

## (undated) DEVICE — CHLORAPREP 26 ML APPLICATOR - ORANGE TINT(25/CA)

## (undated) DEVICE — SUTURE 2-0 PDS II CT-2 - (36/BX)

## (undated) DEVICE — TRAY SRGPRP PVP IOD WT PRP - (20/CA)

## (undated) DEVICE — TOWEL STOP TIMEOUT SAFETY FLAG (40EA/CA)

## (undated) DEVICE — CANNULA O2 COMFORT SOFT EAR ADULT 7 FT TUBING (50/CA)

## (undated) DEVICE — SWAB ANAEROBIC SPEC.COLLECTOR - (25/PK 4PK/CA 100EA/CA)

## (undated) DEVICE — SET LEADWIRE 5 LEAD BEDSIDE DISPOSABLE ECG (1SET OF 5/EA)

## (undated) DEVICE — STAPLER SKIN DISP - (6/BX 10BX/CA) VISISTAT

## (undated) DEVICE — PACK UPPER EXTREMITY SM OR - (3/CA)

## (undated) DEVICE — TUBING CLEARLINK DUO-VENT - C-FLO (48EA/CA)

## (undated) DEVICE — SUTURE 2-0 VICRYL PLUS CT-1 - 8 X 18 INCH(12/BX)

## (undated) DEVICE — CANISTER SUCTION 3000ML MECHANICAL FILTER AUTO SHUTOFF MEDI-VAC NONSTERILE LF DISP  (40EA/CA)

## (undated) DEVICE — GLOVE BIOGEL SZ 8 SURGICAL PF LTX - (50PR/BX 4BX/CA)

## (undated) DEVICE — SUTURE 0 PDS-2 CTX 36 INCH - (24/BX)

## (undated) DEVICE — CANISTER SUCTION RIGID RED 1500CC (40EA/CA)

## (undated) DEVICE — SUCTION INSTRUMENT YANKAUER BULBOUS TIP W/O VENT (50EA/CA)

## (undated) DEVICE — PAD PREP 24 X 48 CUFFED - (100/CA)

## (undated) DEVICE — COVER LIGHT HANDLE FLEXIBLE - SOFT (2EA/PK 80PK/CA)

## (undated) DEVICE — PACK MINOR BASIN - (2EA/CA)

## (undated) DEVICE — GLOVE BIOGEL PI ULTRATOUCH SZ 7.5 SURGICAL PF LF -(50/BX 4BX/CA)

## (undated) DEVICE — DRAPE SURGICAL U 77X120 - (10/CA)

## (undated) DEVICE — LACTATED RINGERS INJ 1000 ML - (14EA/CA 60CA/PF)

## (undated) DEVICE — ELECTRODE DUAL RETURN W/ CORD - (50/PK)

## (undated) DEVICE — MASK OXYGEN VNYL ADLT MED CONC WITH 7 FOOT TUBING  - (50EA/CA)

## (undated) DEVICE — SLEEVE VASO CALF MED - (10PR/CA)